# Patient Record
Sex: FEMALE | Race: WHITE | Employment: OTHER | URBAN - METROPOLITAN AREA
[De-identification: names, ages, dates, MRNs, and addresses within clinical notes are randomized per-mention and may not be internally consistent; named-entity substitution may affect disease eponyms.]

---

## 2019-06-25 ENCOUNTER — TRANSFERRED RECORDS (OUTPATIENT)
Dept: HEALTH INFORMATION MANAGEMENT | Facility: CLINIC | Age: 49
End: 2019-06-25

## 2019-07-01 ENCOUNTER — TRANSFERRED RECORDS (OUTPATIENT)
Dept: HEALTH INFORMATION MANAGEMENT | Facility: CLINIC | Age: 49
End: 2019-07-01

## 2019-07-02 ENCOUNTER — TRANSFERRED RECORDS (OUTPATIENT)
Dept: HEALTH INFORMATION MANAGEMENT | Facility: CLINIC | Age: 49
End: 2019-07-02

## 2019-07-05 ENCOUNTER — TELEPHONE (OUTPATIENT)
Dept: ONCOLOGY | Facility: CLINIC | Age: 49
End: 2019-07-05

## 2019-07-08 ENCOUNTER — PRE VISIT (OUTPATIENT)
Dept: ONCOLOGY | Facility: CLINIC | Age: 49
End: 2019-07-08

## 2019-07-08 ENCOUNTER — ONCOLOGY VISIT (OUTPATIENT)
Dept: ONCOLOGY | Facility: CLINIC | Age: 49
End: 2019-07-08
Attending: SURGERY
Payer: COMMERCIAL

## 2019-07-08 ENCOUNTER — TELEPHONE (OUTPATIENT)
Dept: ONCOLOGY | Facility: CLINIC | Age: 49
End: 2019-07-08

## 2019-07-08 ENCOUNTER — ANCILLARY PROCEDURE (OUTPATIENT)
Dept: MAMMOGRAPHY | Facility: CLINIC | Age: 49
End: 2019-07-08
Attending: SURGERY
Payer: COMMERCIAL

## 2019-07-08 VITALS
TEMPERATURE: 98.3 F | HEIGHT: 69 IN | WEIGHT: 143.2 LBS | HEART RATE: 85 BPM | BODY MASS INDEX: 21.21 KG/M2 | DIASTOLIC BLOOD PRESSURE: 78 MMHG | OXYGEN SATURATION: 97 % | RESPIRATION RATE: 16 BRPM | SYSTOLIC BLOOD PRESSURE: 119 MMHG

## 2019-07-08 DIAGNOSIS — Z17.0 MALIGNANT NEOPLASM OF UPPER-OUTER QUADRANT OF LEFT BREAST IN FEMALE, ESTROGEN RECEPTOR POSITIVE (H): ICD-10-CM

## 2019-07-08 DIAGNOSIS — C50.412 MALIGNANT NEOPLASM OF UPPER-OUTER QUADRANT OF LEFT BREAST IN FEMALE, ESTROGEN RECEPTOR POSITIVE (H): ICD-10-CM

## 2019-07-08 DIAGNOSIS — C50.912 MALIGNANT NEOPLASM OF LEFT BREAST (H): Primary | ICD-10-CM

## 2019-07-08 DIAGNOSIS — C50.912 MALIGNANT NEOPLASM OF LEFT BREAST (H): ICD-10-CM

## 2019-07-08 PROCEDURE — 40000803 ZZHCL STATISTIC DNA ISOL HIGH PURITY: Performed by: STUDENT IN AN ORGANIZED HEALTH CARE EDUCATION/TRAINING PROGRAM

## 2019-07-08 PROCEDURE — G0463 HOSPITAL OUTPT CLINIC VISIT: HCPCS | Mod: ZF

## 2019-07-08 PROCEDURE — 36415 COLL VENOUS BLD VENIPUNCTURE: CPT | Performed by: STUDENT IN AN ORGANIZED HEALTH CARE EDUCATION/TRAINING PROGRAM

## 2019-07-08 RX ORDER — ASPIRIN 325 MG
325 TABLET ORAL DAILY
COMMUNITY

## 2019-07-08 RX ORDER — MIRTAZAPINE 30 MG/1
30 TABLET, FILM COATED ORAL AT BEDTIME
COMMUNITY
Start: 2019-06-07

## 2019-07-08 ASSESSMENT — PAIN SCALES - GENERAL: PAINLEVEL: NO PAIN (0)

## 2019-07-08 ASSESSMENT — MIFFLIN-ST. JEOR: SCORE: 1328.93

## 2019-07-08 NOTE — TELEPHONE ENCOUNTER
ONCOLOGY INTAKE: Records Information         new pt for Mon 7/8 -Dr Jiménez   Received: 7/5/19 3:35pm   Message Contents   APPT INFORMATION:   Referring provider:  self referred to Dr Jiménez   Referring provider s clinic: [facility]   Reason for visit/diagnosis:  new left breast cancer     Has the patient been given a timeframe or date/time of appt?: Yes     Is this appointment held on the schedule- Yes Mon 7/8  @4 pm     Is there any additional testing/work up needed prior to visit? Need ER/W[place] results from 7/2     Has the patient been notified of diagnosis and appointment referral? Yes, her sister is a nurse here and is Dr Jiménez's pt     Were the records sent directly to clinic? I think they are pushing images (Med Records [id number])     Has patient been seen for any external appt for this diagnosis (enter clinic/location) that records should be gathered from? Work-up per above

## 2019-07-08 NOTE — TELEPHONE ENCOUNTER
I called Tiffanie & confirmed her appt with Dr Jiménez for today at 4pm. I updated her demographics & insurance information also. [employer]her[employer] sent to records to records team also.
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
## 2019-07-08 NOTE — TELEPHONE ENCOUNTER
RECORDS STATUS - BREAST    RECORDS REQUESTED FROM: [facility]    DATE REQUESTED: 7/8/19   NOTES DETAILS STATUS   OFFICE NOTE from referring provider N/A Self-referred    OFFICE NOTE from medical oncologist N/A    OFFICE NOTE from surgeon N/A    OFFICE NOTE from radiation oncologist N/A    DISCHARGE SUMMARY from hospital N/A    DISCHARGE REPORT from the ER N/A    OPERATIVE REPORT N/A    MEDICATION LIST Complete  Epic/CE   CLINICAL TRIAL TREATMENTS TO DATE N/A    LABS     PATHOLOGY REPORTS  (Tissue diagnosis, Stage, ER/ID percentage positive and intensity of staining, HER2 IHC, FISH, and all biopsies from breast and any distant metastasis)                 Requested  Fax request sent to [phone]  [facility]    GENONOMIC TESTING     TYPE:   (Next Generation Sequencing, including Foundation One testing, and Oncotype score) N/A    IMAGING (NEED IMAGES & REPORT)     CT SCANS     MRI     MAMMO Complete  PACS   ULTRASOUND Complete  PACS   PET     BONE SCAN     BRAIN MRI       Action    Action Taken 7/8/19   12:51pm     IMGs ([place]) being resolved in PACS. [place] will refax MR to Oncology fax number. MR were sent to the breast fax on Friday. IMG reports will be included in fax.   [place]'s ph: [phone]    [facility]: Ph: [phone] Attn: Celsa Steen will fax Bx report   Will send a fax to [phone]    [place]- doesn't have any MR or IMG on file     1:28pm   Urgently faxed MR ([facility]) to HIM.   Sent Bx fax to [facility].     3:41pm Called [facility] back and spoke to Celsa. I mentioned that there might be some additional IMG from 7/2/19. She's going to look into it and call me back soon. It's possible the US biopsy didn't include IMG. Reports are in CE.     Received a call back and resolved two additional IMGs in PACS. Urgently faxed Bx Report to HIM.      Action    Action Taken 7/9/19 10am     [facility] called saying pathology should arrive  tomorrow.

## 2019-07-08 NOTE — NURSING NOTE
"Oncology Rooming Note    July 8, 2019 3:39 PM   Tiffanie Quesada is a 49 year old female who presents for:    Chief Complaint   Patient presents with     New Patient     NEW PT; NEW BREAST CA; VITALS COMPLETED BY CMA      Initial Vitals: /78   Pulse 85   Temp 98.3  F (36.8  C) (Oral)   Resp 16   Ht 1.753 m (5' 9\")   Wt 65 kg (143 lb 3.2 oz)   SpO2 97%   Breastfeeding? No   BMI 21.15 kg/m   Estimated body mass index is 21.15 kg/m  as calculated from the following:    Height as of this encounter: 1.753 m (5' 9\").    Weight as of this encounter: 65 kg (143 lb 3.2 oz). Body surface area is 1.78 meters squared.  No Pain (0) Comment: Data Unavailable   No LMP recorded. Patient has had a hysterectomy.  Allergies reviewed: Yes  Medications reviewed: Yes    Medications: Medication refills not needed today.  Pharmacy name entered into [employer]: [id number] - [place], [place] - [address] [place] [address]    Clinical concerns: No new concerns today  Dr. Jiménez  was notified.      Jackelin Osman              "

## 2019-07-08 NOTE — TELEPHONE ENCOUNTER
7/5/19 4:36pm  I called & left a message for pt to call to confirm appt on 7/8/19 at 4pm with Dr Jessy gottlieb received from Dannielle HANNAH on 7/5/19 at 3:35pm

## 2019-07-08 NOTE — TELEPHONE ENCOUNTER
7/8/19 9:08am  2nd voicemail left for pt to call to confirm appt with Dr Jiménez for today, 7/8/19 at 4pm.

## 2019-07-09 LAB — COPATH REPORT: NORMAL

## 2019-07-12 ENCOUNTER — APPOINTMENT (OUTPATIENT)
Dept: LAB | Facility: CLINIC | Age: 49
End: 2019-07-12
Attending: SURGERY
Payer: COMMERCIAL

## 2019-07-12 PROCEDURE — 00000346 ZZHCL STATISTIC REVIEW OUTSIDE SLIDES TC 88321: Performed by: SURGERY

## 2019-07-15 ENCOUNTER — TELEPHONE (OUTPATIENT)
Dept: ONCOLOGY | Facility: CLINIC | Age: 49
End: 2019-07-15

## 2019-07-15 NOTE — TELEPHONE ENCOUNTER
----- Message from Vanessa Lyon sent at 7/15/2019  8:05 AM CDT -----  Regarding: Pt called back   Hello,  Patient called back regarding decisions about her procedure with Dr. Jiménez. Please contact her when you are able.    Thank you,  Vanessa COPPOLA, Clinic Coordinator  [facility]  [phone]

## 2019-07-15 NOTE — TELEPHONE ENCOUNTER
----- Message -----  From: Velma Dubose PA-C  Sent: 7/15/2019  12:42 PM  To: Pee Jiménez MD, Dannielle Rubio RN  Subject: FW: Pt called back                               I spoke with Tiffanie. She would like to have a bilateral mastectomy without reconstruction. She would like to discuss this over the phone with Dr. Jiménez. She lives over 90 miles away.     Velma

## 2019-07-15 NOTE — TELEPHONE ENCOUNTER
"I spoke to patient per note below. She states that she saw Dr Jiménez last Mon (7/8) for breast cancer surgical consultation & \"he said it was my choice to do lumpectomy or remove both breasts.\" (7/8 MD note currently pending).     Pt would like to know more about next steps if she chooses to remove both breasts. I advised pt that I would send a msg to Dr Jiménez's team but he may not get back to us until tomorrow or Wed as he may be in the OR. Pt agrees & verbalizes understanding of this plan.      "

## 2019-07-16 LAB — COPATH REPORT: NORMAL

## 2019-07-19 DIAGNOSIS — C50.912 MALIGNANT NEOPLASM OF LEFT BREAST (H): Primary | ICD-10-CM

## 2019-07-19 RX ORDER — CEFAZOLIN SODIUM 1 G/50ML
1 INJECTION, SOLUTION INTRAVENOUS SEE ADMIN INSTRUCTIONS
Status: CANCELLED | OUTPATIENT
Start: 2019-07-19

## 2019-07-19 RX ORDER — CEFAZOLIN SODIUM 2 G/50ML
2 SOLUTION INTRAVENOUS
Status: CANCELLED | OUTPATIENT
Start: 2019-07-19

## 2019-07-22 ENCOUNTER — TELEPHONE (OUTPATIENT)
Dept: ONCOLOGY | Facility: CLINIC | Age: 49
End: 2019-07-22

## 2019-07-22 NOTE — TELEPHONE ENCOUNTER
Left message to schedule procedure with Dr Pee Jiménez    Details left with my direct contact number for scheduling.     Edna Arriaga  Elise-Op Surgical Coordinator  [phone]

## 2019-07-23 NOTE — TELEPHONE ENCOUNTER
Spoke with patient to schedule surgery with Dr Pee Jiménez    Surgery date has been requested by OR for:  8/7 at [facility]     Pre/Post or additional appointments will be scheduled once surgery date is confirmed.    Edna Chatterjee  Surgical Elise-Op Coordinator  [phone]

## 2019-07-24 NOTE — TELEPHONE ENCOUNTER
Spoke/left message with: Tiffanie to schedule surgery with Dr Pee Jiménez     Surgery was scheduled on 8/14 at [place] OR    Patient will have pre-surgery visit with PCP -      Nuclear Medicine: TO BE DELIVERED TO THE [facility] [address]    Implants/Special Equipment: na    Wire Loc: na    -Patient advised H&P is needed or surgery cancellation may occur    Post-Op care appointment scheduled?  YES on      Patient is aware a / is needed day of surgery.     Surgery packet was delivered to patient via mail, patient has my direct contact information for any further questions.        Additional comments:       Edna Arriaga  Surgical Elise-Op Coordinator  [phone]     \

## 2019-07-30 DIAGNOSIS — C50.412 MALIGNANT NEOPLASM OF UPPER-OUTER QUADRANT OF LEFT BREAST IN FEMALE, ESTROGEN RECEPTOR POSITIVE (H): Primary | ICD-10-CM

## 2019-07-30 DIAGNOSIS — Z17.0 MALIGNANT NEOPLASM OF UPPER-OUTER QUADRANT OF LEFT BREAST IN FEMALE, ESTROGEN RECEPTOR POSITIVE (H): Primary | ICD-10-CM

## 2019-07-30 PROCEDURE — 81323 PTEN GENE DUP/DELET VARIANT: CPT | Performed by: SURGERY

## 2019-07-30 PROCEDURE — 81479 UNLISTED MOLECULAR PATHOLOGY: CPT | Performed by: SURGERY

## 2019-07-30 PROCEDURE — 81408 MOPATH PROCEDURE LEVEL 9: CPT | Performed by: SURGERY

## 2019-07-30 PROCEDURE — 81405 MOPATH PROCEDURE LEVEL 6: CPT | Performed by: SURGERY

## 2019-07-30 PROCEDURE — 81408 MOPATH PROCEDURE LEVEL 9: CPT | Mod: 91 | Performed by: SURGERY

## 2019-07-30 PROCEDURE — 81162 BRCA1&2 GEN FULL SEQ DUP/DEL: CPT | Performed by: SURGERY

## 2019-07-30 PROCEDURE — 81406 MOPATH PROCEDURE LEVEL 7: CPT | Mod: 91 | Performed by: SURGERY

## 2019-07-30 PROCEDURE — 81321 PTEN GENE FULL SEQUENCE: CPT | Performed by: SURGERY

## 2019-07-30 PROCEDURE — 81406 MOPATH PROCEDURE LEVEL 7: CPT | Performed by: SURGERY

## 2019-07-31 LAB — COPATH REPORT: NORMAL

## 2019-08-07 ENCOUNTER — ANESTHESIA EVENT (OUTPATIENT)
Dept: SURGERY | Facility: CLINIC | Age: 49
End: 2019-08-07
Payer: COMMERCIAL

## 2019-08-13 ENCOUNTER — TELEPHONE (OUTPATIENT)
Dept: ONCOLOGY | Facility: CLINIC | Age: 49
End: 2019-08-13

## 2019-08-13 NOTE — ANESTHESIA PREPROCEDURE EVALUATION
Anesthesia Pre-Procedure Evaluation    Patient: Tiffanie Quesada   MRN:     [id number] Gender:   female   Age:    49 year old :      1970        Preoperative Diagnosis: Malignant Neoplasm Of Left Breast   Procedure(s):  Bilateral Mastectomy, Left [place] Node Biopsy     No past medical history on file.   No past surgical history on file.       Anesthesia Evaluation     . Pt has had prior anesthetic. Type: General           ROS/MED HX    ENT/Pulmonary:     (+)tobacco use (1 cigaretter per day), Current use , . .   Asthma: 1 cig/day.   Neurologic: Comment: Left radial nerve palsy in the past, now resolved.       Cardiovascular:  - neg cardiovascular ROS   (+) ----. : . . . :. . 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         Other:                         PHYSICAL EXAM:   Mental Status/Neuro: A/A/O   Airway: Facies: Feasible  Mallampati: I  Mouth/Opening: Full  TM distance: > 6 cm  Neck ROM: Full   Respiratory: Auscultation: CTAB     Resp. Rate: Normal     Resp. Effort: Normal      CV: Rhythm: Regular  Heart: Normal Sounds  Edema: None   Comments:      Dental: Normal Dentition                LABS:  CBC: No results found for: WBC, HGB, HCT, PLT  BMP: No results found for: NA, POTASSIUM, CHLORIDE, CO2, BUN, CR, GLC  COAGS: No results found for: PTT, INR, FIBR  POC: No results found for: BGM, HCG, HCGS  OTHER: No results found for: PH, LACT, A1C, LAKHWINDER, PHOS, MAG, ALBUMIN, PROTTOTAL, ALT, AST, GGT, ALKPHOS, BILITOTAL, BILIDIRECT, LIPASE, AMYLASE, SADE, TSH, T4, T3, CRP, SED     Preop Vitals    BP Readings from  "Last 3 Encounters:   07/08/19 119/78    Pulse Readings from Last 3 Encounters:   07/08/19 85      Resp Readings from Last 3 Encounters:   07/08/19 16    SpO2 Readings from Last 3 Encounters:   07/08/19 97%      Temp Readings from Last 1 Encounters:   07/08/19 36.8  C (98.3  F) (Oral)    Ht Readings from Last 1 Encounters:   07/08/19 1.753 m (5' 9\")      Wt Readings from Last 1 Encounters:   07/08/19 65 kg (143 lb 3.2 oz)    Estimated body mass index is 21.15 kg/m  as calculated from the following:    Height as of 7/8/19: 1.753 m (5' 9\").    Weight as of 7/8/19: 65 kg (143 lb 3.2 oz).     LDA:        Assessment:   ASA SCORE: 2    H&P: History and physical reviewed and following examination; no interval change.     Smoking Status:  Active Smoker       - patient did not smoke on day of surgery       - instructed to abstain from smoking on day of procedure   NPO Status: NPO Appropriate     Plan:   Anes. Type:  General   Pre-Medication: Acetaminophen; Gabapentin   Induction:  IV (Standard)   Airway: ETT; Oral   Access/Monitoring: PIV; 2nd PIV   Maintenance: Balanced     Postop Plan:   Postop Pain: Opioids  Postop Sedation/Airway: Not planned  Disposition: Inpatient/Admit     PONV Management:   Adult Risk Factors: Female, Postop Opioids   Prevention: Ondansetron, Dexamethasone     CONSENT: Direct conversation   Plan and risks discussed with: Patient   Blood Products: Consented (ALL Blood Products)       Comments for Plan/Consent:  49F smoker with breast cancer here for mastectomy.  ASA 2.  Plan: GETA, PIVx2                   Sunshine Nunez MD  "

## 2019-08-14 ENCOUNTER — HOSPITAL ENCOUNTER (OUTPATIENT)
Dept: NUCLEAR MEDICINE | Facility: CLINIC | Age: 49
Setting detail: NUCLEAR MEDICINE
End: 2019-08-14
Attending: SURGERY
Payer: COMMERCIAL

## 2019-08-14 ENCOUNTER — TRANSFERRED RECORDS (OUTPATIENT)
Dept: HEALTH INFORMATION MANAGEMENT | Facility: CLINIC | Age: 49
End: 2019-08-14

## 2019-08-14 ENCOUNTER — HOSPITAL ENCOUNTER (OUTPATIENT)
Facility: CLINIC | Age: 49
Discharge: HOME | End: 2019-08-15
Attending: SURGERY | Admitting: SURGERY
Payer: COMMERCIAL

## 2019-08-14 ENCOUNTER — ANESTHESIA (OUTPATIENT)
Dept: SURGERY | Facility: CLINIC | Age: 49
End: 2019-08-14
Payer: COMMERCIAL

## 2019-08-14 DIAGNOSIS — C50.912 MALIGNANT NEOPLASM OF LEFT BREAST (H): ICD-10-CM

## 2019-08-14 DIAGNOSIS — G89.18 POST-OPERATIVE PAIN: Primary | ICD-10-CM

## 2019-08-14 PROBLEM — Z90.13 S/P MASTECTOMY, BILATERAL: Status: ACTIVE | Noted: 2019-08-14

## 2019-08-14 LAB — GLUCOSE BLDC GLUCOMTR-MCNC: 88 MG/DL (ref 70–99)

## 2019-08-14 PROCEDURE — 25000132 ZZH RX MED GY IP 250 OP 250 PS 637: Performed by: STUDENT IN AN ORGANIZED HEALTH CARE EDUCATION/TRAINING PROGRAM

## 2019-08-14 PROCEDURE — 25000125 ZZHC RX 250: Performed by: STUDENT IN AN ORGANIZED HEALTH CARE EDUCATION/TRAINING PROGRAM

## 2019-08-14 PROCEDURE — 25000132 ZZH RX MED GY IP 250 OP 250 PS 637: Performed by: PHYSICIAN ASSISTANT

## 2019-08-14 PROCEDURE — 25000566 ZZH SEVOFLURANE, EA 15 MIN: Performed by: SURGERY

## 2019-08-14 PROCEDURE — 25000128 H RX IP 250 OP 636: Performed by: SURGERY

## 2019-08-14 PROCEDURE — 71000015 ZZH RECOVERY PHASE 1 LEVEL 2 EA ADDTL HR: Performed by: SURGERY

## 2019-08-14 PROCEDURE — 25000128 H RX IP 250 OP 636: Performed by: STUDENT IN AN ORGANIZED HEALTH CARE EDUCATION/TRAINING PROGRAM

## 2019-08-14 PROCEDURE — 38792 RA TRACER ID OF SENTINL NODE: CPT

## 2019-08-14 PROCEDURE — 27210794 ZZH OR GENERAL SUPPLY STERILE: Performed by: SURGERY

## 2019-08-14 PROCEDURE — 37000008 ZZH ANESTHESIA TECHNICAL FEE, 1ST 30 MIN: Performed by: SURGERY

## 2019-08-14 PROCEDURE — 36000057 ZZH SURGERY LEVEL 3 1ST 30 MIN - UMMC: Performed by: SURGERY

## 2019-08-14 PROCEDURE — 40000170 ZZH STATISTIC PRE-PROCEDURE ASSESSMENT II: Performed by: SURGERY

## 2019-08-14 PROCEDURE — 37000009 ZZH ANESTHESIA TECHNICAL FEE, EACH ADDTL 15 MIN: Performed by: SURGERY

## 2019-08-14 PROCEDURE — 36000059 ZZH SURGERY LEVEL 3 EA 15 ADDTL MIN UMMC: Performed by: SURGERY

## 2019-08-14 PROCEDURE — 88307 TISSUE EXAM BY PATHOLOGIST: CPT | Performed by: SURGERY

## 2019-08-14 PROCEDURE — 25800030 ZZH RX IP 258 OP 636: Performed by: STUDENT IN AN ORGANIZED HEALTH CARE EDUCATION/TRAINING PROGRAM

## 2019-08-14 PROCEDURE — 25000125 ZZHC RX 250: Performed by: SURGERY

## 2019-08-14 PROCEDURE — 82962 GLUCOSE BLOOD TEST: CPT

## 2019-08-14 PROCEDURE — 25000128 H RX IP 250 OP 636: Performed by: PHYSICIAN ASSISTANT

## 2019-08-14 PROCEDURE — 71000014 ZZH RECOVERY PHASE 1 LEVEL 2 FIRST HR: Performed by: SURGERY

## 2019-08-14 RX ORDER — SODIUM CHLORIDE, SODIUM LACTATE, POTASSIUM CHLORIDE, CALCIUM CHLORIDE 600; 310; 30; 20 MG/100ML; MG/100ML; MG/100ML; MG/100ML
INJECTION, SOLUTION INTRAVENOUS CONTINUOUS
Status: DISCONTINUED | OUTPATIENT
Start: 2019-08-14 | End: 2019-08-14 | Stop reason: HOSPADM

## 2019-08-14 RX ORDER — OXYCODONE HYDROCHLORIDE 5 MG/1
5-10 TABLET ORAL EVERY 4 HOURS PRN
Status: DISCONTINUED | OUTPATIENT
Start: 2019-08-14 | End: 2019-08-15

## 2019-08-14 RX ORDER — GLYCOPYRROLATE 0.2 MG/ML
INJECTION, SOLUTION INTRAMUSCULAR; INTRAVENOUS PRN
Status: DISCONTINUED | OUTPATIENT
Start: 2019-08-14 | End: 2019-08-14

## 2019-08-14 RX ORDER — NALOXONE HYDROCHLORIDE 0.4 MG/ML
.1-.4 INJECTION, SOLUTION INTRAMUSCULAR; INTRAVENOUS; SUBCUTANEOUS
Status: DISCONTINUED | OUTPATIENT
Start: 2019-08-15 | End: 2019-08-15 | Stop reason: HOSPADM

## 2019-08-14 RX ORDER — SODIUM CHLORIDE, SODIUM LACTATE, POTASSIUM CHLORIDE, CALCIUM CHLORIDE 600; 310; 30; 20 MG/100ML; MG/100ML; MG/100ML; MG/100ML
INJECTION, SOLUTION INTRAVENOUS CONTINUOUS PRN
Status: DISCONTINUED | OUTPATIENT
Start: 2019-08-14 | End: 2019-08-14

## 2019-08-14 RX ORDER — LIDOCAINE 40 MG/G
CREAM TOPICAL
Status: DISCONTINUED | OUTPATIENT
Start: 2019-08-14 | End: 2019-08-15 | Stop reason: HOSPADM

## 2019-08-14 RX ORDER — ACETAMINOPHEN 325 MG/1
975 TABLET ORAL ONCE
Status: COMPLETED | OUTPATIENT
Start: 2019-08-14 | End: 2019-08-14

## 2019-08-14 RX ORDER — PROPOFOL 10 MG/ML
INJECTION, EMULSION INTRAVENOUS PRN
Status: DISCONTINUED | OUTPATIENT
Start: 2019-08-14 | End: 2019-08-14

## 2019-08-14 RX ORDER — HYDROMORPHONE HYDROCHLORIDE 1 MG/ML
.3-.5 INJECTION, SOLUTION INTRAMUSCULAR; INTRAVENOUS; SUBCUTANEOUS EVERY 5 MIN PRN
Status: DISCONTINUED | OUTPATIENT
Start: 2019-08-14 | End: 2019-08-14 | Stop reason: HOSPADM

## 2019-08-14 RX ORDER — ACETAMINOPHEN 325 MG/1
325-650 TABLET ORAL EVERY 4 HOURS PRN
Qty: 50 TABLET | Refills: 0 | Status: SHIPPED | OUTPATIENT
Start: 2019-08-14

## 2019-08-14 RX ORDER — CEFAZOLIN SODIUM 1 G/3ML
1 INJECTION, POWDER, FOR SOLUTION INTRAMUSCULAR; INTRAVENOUS SEE ADMIN INSTRUCTIONS
Status: DISCONTINUED | OUTPATIENT
Start: 2019-08-14 | End: 2019-08-14 | Stop reason: HOSPADM

## 2019-08-14 RX ORDER — NALOXONE HYDROCHLORIDE 0.4 MG/ML
.1-.4 INJECTION, SOLUTION INTRAMUSCULAR; INTRAVENOUS; SUBCUTANEOUS
Status: ACTIVE | OUTPATIENT
Start: 2019-08-14 | End: 2019-08-15

## 2019-08-14 RX ORDER — ACETAMINOPHEN 325 MG/1
650 TABLET ORAL EVERY 6 HOURS PRN
Status: DISCONTINUED | OUTPATIENT
Start: 2019-08-14 | End: 2019-08-15

## 2019-08-14 RX ORDER — ONDANSETRON 2 MG/ML
INJECTION INTRAMUSCULAR; INTRAVENOUS PRN
Status: DISCONTINUED | OUTPATIENT
Start: 2019-08-14 | End: 2019-08-14

## 2019-08-14 RX ORDER — LABETALOL 20 MG/4 ML (5 MG/ML) INTRAVENOUS SYRINGE
10
Status: DISCONTINUED | OUTPATIENT
Start: 2019-08-14 | End: 2019-08-14 | Stop reason: HOSPADM

## 2019-08-14 RX ORDER — FENTANYL CITRATE 50 UG/ML
INJECTION, SOLUTION INTRAMUSCULAR; INTRAVENOUS PRN
Status: DISCONTINUED | OUTPATIENT
Start: 2019-08-14 | End: 2019-08-14

## 2019-08-14 RX ORDER — MIRTAZAPINE 15 MG/1
30 TABLET, FILM COATED ORAL AT BEDTIME
Status: DISCONTINUED | OUTPATIENT
Start: 2019-08-14 | End: 2019-08-15 | Stop reason: HOSPADM

## 2019-08-14 RX ORDER — OXYCODONE HYDROCHLORIDE 5 MG/1
5-10 TABLET ORAL EVERY 6 HOURS PRN
Qty: 20 TABLET | Refills: 0 | Status: SHIPPED | OUTPATIENT
Start: 2019-08-14

## 2019-08-14 RX ORDER — ONDANSETRON 4 MG/1
4 TABLET, ORALLY DISINTEGRATING ORAL EVERY 30 MIN PRN
Status: DISCONTINUED | OUTPATIENT
Start: 2019-08-14 | End: 2019-08-14 | Stop reason: HOSPADM

## 2019-08-14 RX ORDER — ISOSULFAN BLUE 50 MG/5ML
INJECTION, SOLUTION SUBCUTANEOUS PRN
Status: DISCONTINUED | OUTPATIENT
Start: 2019-08-14 | End: 2019-08-14 | Stop reason: HOSPADM

## 2019-08-14 RX ORDER — LIDOCAINE HYDROCHLORIDE 20 MG/ML
INJECTION, SOLUTION INFILTRATION; PERINEURAL PRN
Status: DISCONTINUED | OUTPATIENT
Start: 2019-08-14 | End: 2019-08-14

## 2019-08-14 RX ORDER — HYDROMORPHONE HCL/0.9% NACL/PF 0.2MG/0.2
0.2 SYRINGE (ML) INTRAVENOUS
Status: DISCONTINUED | OUTPATIENT
Start: 2019-08-14 | End: 2019-08-15 | Stop reason: HOSPADM

## 2019-08-14 RX ORDER — DEXAMETHASONE SODIUM PHOSPHATE 4 MG/ML
INJECTION, SOLUTION INTRA-ARTICULAR; INTRALESIONAL; INTRAMUSCULAR; INTRAVENOUS; SOFT TISSUE PRN
Status: DISCONTINUED | OUTPATIENT
Start: 2019-08-14 | End: 2019-08-14

## 2019-08-14 RX ORDER — ONDANSETRON 2 MG/ML
4 INJECTION INTRAMUSCULAR; INTRAVENOUS EVERY 30 MIN PRN
Status: DISCONTINUED | OUTPATIENT
Start: 2019-08-14 | End: 2019-08-14 | Stop reason: HOSPADM

## 2019-08-14 RX ORDER — FENTANYL CITRATE 50 UG/ML
25-50 INJECTION, SOLUTION INTRAMUSCULAR; INTRAVENOUS
Status: DISCONTINUED | OUTPATIENT
Start: 2019-08-14 | End: 2019-08-14 | Stop reason: HOSPADM

## 2019-08-14 RX ORDER — GABAPENTIN 300 MG/1
300 CAPSULE ORAL ONCE
Status: COMPLETED | OUTPATIENT
Start: 2019-08-14 | End: 2019-08-14

## 2019-08-14 RX ORDER — CEFAZOLIN SODIUM 2 G/100ML
2 INJECTION, SOLUTION INTRAVENOUS
Status: COMPLETED | OUTPATIENT
Start: 2019-08-14 | End: 2019-08-14

## 2019-08-14 RX ADMIN — CEFAZOLIN SODIUM 2 G: 2 INJECTION, SOLUTION INTRAVENOUS at 07:55

## 2019-08-14 RX ADMIN — LIDOCAINE HYDROCHLORIDE 60 MG: 20 INJECTION, SOLUTION INFILTRATION; PERINEURAL at 07:35

## 2019-08-14 RX ADMIN — OXYCODONE HYDROCHLORIDE 5 MG: 5 TABLET ORAL at 14:50

## 2019-08-14 RX ADMIN — HYDROMORPHONE HYDROCHLORIDE 0.2 MG: 1 INJECTION, SOLUTION INTRAMUSCULAR; INTRAVENOUS; SUBCUTANEOUS at 13:39

## 2019-08-14 RX ADMIN — MIRTAZAPINE 30 MG: 15 TABLET, FILM COATED ORAL at 21:22

## 2019-08-14 RX ADMIN — PHENYLEPHRINE HYDROCHLORIDE 50 MCG: 10 INJECTION INTRAVENOUS at 09:00

## 2019-08-14 RX ADMIN — ROCURONIUM BROMIDE 10 MG: 10 INJECTION INTRAVENOUS at 09:31

## 2019-08-14 RX ADMIN — HYDROMORPHONE HYDROCHLORIDE 0.25 MG: 1 INJECTION, SOLUTION INTRAMUSCULAR; INTRAVENOUS; SUBCUTANEOUS at 11:00

## 2019-08-14 RX ADMIN — PROPOFOL 120 MG: 10 INJECTION, EMULSION INTRAVENOUS at 07:33

## 2019-08-14 RX ADMIN — GLYCOPYRROLATE 0.2 MG: 0.2 INJECTION, SOLUTION INTRAMUSCULAR; INTRAVENOUS at 07:37

## 2019-08-14 RX ADMIN — DEXAMETHASONE SODIUM PHOSPHATE 4 MG: 4 INJECTION, SOLUTION INTRA-ARTICULAR; INTRALESIONAL; INTRAMUSCULAR; INTRAVENOUS; SOFT TISSUE at 07:43

## 2019-08-14 RX ADMIN — Medication 0.2 MG: at 19:45

## 2019-08-14 RX ADMIN — OXYCODONE HYDROCHLORIDE 5 MG: 5 TABLET ORAL at 13:30

## 2019-08-14 RX ADMIN — FENTANYL CITRATE 75 MCG: 50 INJECTION, SOLUTION INTRAMUSCULAR; INTRAVENOUS at 07:33

## 2019-08-14 RX ADMIN — HYDROMORPHONE HYDROCHLORIDE 0.3 MG: 1 INJECTION, SOLUTION INTRAMUSCULAR; INTRAVENOUS; SUBCUTANEOUS at 12:48

## 2019-08-14 RX ADMIN — PHENYLEPHRINE HYDROCHLORIDE 100 MCG: 10 INJECTION INTRAVENOUS at 10:33

## 2019-08-14 RX ADMIN — PHENYLEPHRINE HYDROCHLORIDE 100 MCG: 10 INJECTION INTRAVENOUS at 09:39

## 2019-08-14 RX ADMIN — SODIUM CHLORIDE, POTASSIUM CHLORIDE, SODIUM LACTATE AND CALCIUM CHLORIDE: 600; 310; 30; 20 INJECTION, SOLUTION INTRAVENOUS at 07:48

## 2019-08-14 RX ADMIN — SUGAMMADEX 120 MG: 100 INJECTION, SOLUTION INTRAVENOUS at 10:48

## 2019-08-14 RX ADMIN — SODIUM CHLORIDE, POTASSIUM CHLORIDE, SODIUM LACTATE AND CALCIUM CHLORIDE: 600; 310; 30; 20 INJECTION, SOLUTION INTRAVENOUS at 08:00

## 2019-08-14 RX ADMIN — ACETAMINOPHEN 650 MG: 325 TABLET, FILM COATED ORAL at 18:43

## 2019-08-14 RX ADMIN — ROCURONIUM BROMIDE 5 MG: 10 INJECTION INTRAVENOUS at 09:47

## 2019-08-14 RX ADMIN — FENTANYL CITRATE 25 MCG: 50 INJECTION INTRAMUSCULAR; INTRAVENOUS at 11:54

## 2019-08-14 RX ADMIN — ROCURONIUM BROMIDE 10 MG: 10 INJECTION INTRAVENOUS at 09:04

## 2019-08-14 RX ADMIN — ONDANSETRON 4 MG: 2 INJECTION INTRAMUSCULAR; INTRAVENOUS at 10:25

## 2019-08-14 RX ADMIN — CEFAZOLIN SODIUM 1 G: 2 INJECTION, SOLUTION INTRAVENOUS at 10:18

## 2019-08-14 RX ADMIN — PHENYLEPHRINE HYDROCHLORIDE 100 MCG: 10 INJECTION INTRAVENOUS at 10:29

## 2019-08-14 RX ADMIN — Medication 0.2 MG: at 21:54

## 2019-08-14 RX ADMIN — PHENYLEPHRINE HYDROCHLORIDE 100 MCG: 10 INJECTION INTRAVENOUS at 07:36

## 2019-08-14 RX ADMIN — ONDANSETRON 4 MG: 2 INJECTION INTRAMUSCULAR; INTRAVENOUS at 11:40

## 2019-08-14 RX ADMIN — ROCURONIUM BROMIDE 10 MG: 10 INJECTION INTRAVENOUS at 08:41

## 2019-08-14 RX ADMIN — PHENYLEPHRINE HYDROCHLORIDE 50 MCG: 10 INJECTION INTRAVENOUS at 10:12

## 2019-08-14 RX ADMIN — HYDROMORPHONE HYDROCHLORIDE 0.25 MG: 1 INJECTION, SOLUTION INTRAMUSCULAR; INTRAVENOUS; SUBCUTANEOUS at 09:55

## 2019-08-14 RX ADMIN — SODIUM CHLORIDE, POTASSIUM CHLORIDE, SODIUM LACTATE AND CALCIUM CHLORIDE: 600; 310; 30; 20 INJECTION, SOLUTION INTRAVENOUS at 07:27

## 2019-08-14 RX ADMIN — FENTANYL CITRATE 25 MCG: 50 INJECTION INTRAMUSCULAR; INTRAVENOUS at 11:45

## 2019-08-14 RX ADMIN — HYDROMORPHONE HYDROCHLORIDE 0.3 MG: 1 INJECTION, SOLUTION INTRAMUSCULAR; INTRAVENOUS; SUBCUTANEOUS at 12:06

## 2019-08-14 RX ADMIN — FENTANYL CITRATE 25 MCG: 50 INJECTION INTRAMUSCULAR; INTRAVENOUS at 11:13

## 2019-08-14 RX ADMIN — OXYCODONE HYDROCHLORIDE 10 MG: 5 TABLET ORAL at 18:43

## 2019-08-14 RX ADMIN — ACETAMINOPHEN 975 MG: 325 TABLET, FILM COATED ORAL at 06:41

## 2019-08-14 RX ADMIN — GABAPENTIN 300 MG: 300 CAPSULE ORAL at 06:41

## 2019-08-14 RX ADMIN — MIDAZOLAM 2 MG: 1 INJECTION INTRAMUSCULAR; INTRAVENOUS at 07:24

## 2019-08-14 RX ADMIN — ROCURONIUM BROMIDE 50 MG: 10 INJECTION INTRAVENOUS at 07:37

## 2019-08-14 RX ADMIN — FENTANYL CITRATE 25 MCG: 50 INJECTION INTRAMUSCULAR; INTRAVENOUS at 11:17

## 2019-08-14 RX ADMIN — PHENYLEPHRINE HYDROCHLORIDE 50 MCG: 10 INJECTION INTRAVENOUS at 08:01

## 2019-08-14 RX ADMIN — PHENYLEPHRINE HYDROCHLORIDE 100 MCG: 10 INJECTION INTRAVENOUS at 09:03

## 2019-08-14 RX ADMIN — ACETAMINOPHEN 650 MG: 325 TABLET, FILM COATED ORAL at 13:30

## 2019-08-14 RX ADMIN — FENTANYL CITRATE 50 MCG: 50 INJECTION, SOLUTION INTRAMUSCULAR; INTRAVENOUS at 08:06

## 2019-08-14 ASSESSMENT — PAIN DESCRIPTION - DESCRIPTORS
DESCRIPTORS: ACHING;CONSTANT
DESCRIPTORS: ACHING;CONSTANT;DISCOMFORT

## 2019-08-14 ASSESSMENT — MIFFLIN-ST. JEOR: SCORE: 1298.38

## 2019-08-14 ASSESSMENT — LIFESTYLE VARIABLES: TOBACCO_USE: 1

## 2019-08-14 NOTE — OR NURSING
Bilateral Mastectomy on 8/14/2019    Weight of right mastectomy: 370g  Weight of left mastectomy: 444g

## 2019-08-14 NOTE — OR NURSING
Text page sent to Dr. Jiménez requesting post op orders and discharge patient.    Dr. Carmichael at bedside in PACU.  Aware of bradycardia.  Notify Dr. Carmichael if patient's heart rate <40.

## 2019-08-14 NOTE — ANESTHESIA CARE TRANSFER NOTE
Patient: Tiffanie Quesada    Procedure(s):  Bilateral Mastectomy, Left [place] Node Biopsy    Diagnosis: Malignant Neoplasm Of Left Breast  Diagnosis Additional Information: No value filed.    Anesthesia Type:   General     Note:  Anesthesia Care Transfer Notewriter    Vitals: (Last set prior to Anesthesia Care Transfer)    CRNA VITALS  8/14/2019 1018 - 8/14/2019 1104      8/14/2019             NIBP Mean:  70    Ht Rate:  43  (Abnormal)                 Electronically Signed By: Sunshine Nunez MD  August 14, 2019  11:04 AM

## 2019-08-14 NOTE — OR NURSING
Patient aware we are awaiting orders as to whether she will be discharged versus admitted. Patient tearful and upset with the thought of being discharged, she states that she is not going to be able to manager her pain and care for the drains while at home. Redirected patient with informing her that the hospital is not always the best place to stay. Patient agreed by stating that since she doesn't have a spleen it would be hard for her to fight an infection. Encouraged patient that we would work towards getting her pain taking care of with oral medications prior to her discharge. And began to educate patient on how to take care of drain with stripping them. Will continue to encourage and reinforce discharge goals with patient. Patient provided Apple Juice and cookies, tolerated without any nausea.

## 2019-08-14 NOTE — ANESTHESIA POSTPROCEDURE EVALUATION
Anesthesia POST Procedure Evaluation    Patient: Tiffanie Quesada   MRN:     [id number] Gender:   female   Age:    49 year old :      1970        Preoperative Diagnosis: Malignant Neoplasm Of Left Breast   Procedure(s):  Bilateral Mastectomy, Left [place] Node Biopsy   Postop Comments: No value filed.       Anesthesia Type:  Not documented  General    Reportable Event: YES     PAIN: Uncomplicated   Sign Out status: Comfortable, Well controlled pain     PONV: PONV Occurence     Symptoms:  Nausea only (mild)     Interventions: 5-HT3 antagonist   Sign Out status:  No Nausea or Vomiting     Neuro/Psych: Uneventful perioperative course   Sign Out Status: Preoperative baseline; Age appropriate mentation     Airway/Resp.: Uneventful perioperative course   Sign Out Status: Non labored breathing, age appropriate RR; Resp. Status within EXPECTED Parameters     CV: Uneventful perioperative course   Sign Out status: Appropriate BP and perfusion indices; Appropriate HR/Rhythm     Disposition:   Sign Out in:  PACU  Disposition:  Floor  Recovery Course: Uneventful  Follow-Up: Not required           Last Anesthesia Record Vitals:  CRNA VITALS  2019 1018 - 2019 1118      2019             NIBP Mean:  70    Ht Rate:  43  (Abnormal)           Last PACU Vitals:  Vitals Value Taken Time   /75 2019  1:00 PM   Temp 36.4  C (97.5  F) 2019 11:30 AM   Pulse 73 2019  1:00 PM   Resp 16 2019 12:45 PM   SpO2 98 % 2019  1:01 PM   Temp src     NIBP     Pulse     SpO2     Resp     Temp     Ht Rate 43 2019 10:56 AM   Temp 2     Vitals shown include unvalidated device data.      Electronically Signed By: Kailee Carmichael MD, 2019, 1:02 PM

## 2019-08-14 NOTE — ANESTHESIA CARE TRANSFER NOTE
Patient: Tiffanie Quesada    Procedure(s):  Bilateral Mastectomy, Left [place] Node Biopsy    Diagnosis: Malignant Neoplasm Of Left Breast  Diagnosis Additional Information: No value filed.    Anesthesia Type:   General     Note:  Airway :Room Air  Patient transferred to:PACU  Handoff Report: Identifed the Patient, Identified the Reponsible Provider, Reviewed the pertinent medical history, Discussed the surgical course, Reviewed Intra-OP anesthesia mangement and issues during anesthesia, Set expectations for post-procedure period and Allowed opportunity for questions and acknowledgement of understanding      Vitals: (Last set prior to Anesthesia Care Transfer)    CRNA VITALS  8/14/2019 1018 - 8/14/2019 1104      8/14/2019             NIBP Mean:  70    Ht Rate:  43  (Abnormal)                 Electronically Signed By: Sunshine Nunez MD  August 14, 2019  11:04 AM

## 2019-08-14 NOTE — OP NOTE
Procedure Date: 08/14/2019      PREOPERATIVE DIAGNOSIS:  Left breast cancer.      POSTOPERATIVE DIAGNOSIS:  Left breast cancer.      PROCEDURES PERFORMED:  Lymphatic mapping, bilateral simple mastectomies, and left axillary sentinel lymph node biopsy x 2.      ATTENDING SURGEON:  Pee Jiménez MD.      ANESTHESIA:  General with endotracheal tube intubation.      INDICATIONS FOR SURGERY:  The patient is a 49-year-old woman who was diagnosed with a left breast cancer.  She did have extension of about 4.5 cm towards the chest wall.  She has elected to have a bilateral mastectomy without immediate reconstruction.      PROCEDURE IN DETAIL:  After informed consent, the patient was brought to the operating room, given a general anesthetic and oral endotracheally intubated.  I injected technetium sulfur colloid and isosulfan blue beneath her left areola.  She was prepped and draped in the usual fashion.  We started on the left side.  I did a wide elliptical incision to include the nipple areolar complex.  The Bovie cautery was used to incise the subcutaneous tissues.  Flaps were raised in all directions with the Bovie cautery.  A superior skin flap was raised to the clavicle, a medial skin flap was raised to lateral border of the sternum, an inferior skin flap was raised to inframammary fold.  Next, the breast and pectoralis fascia were removed from the pectoralis major muscle from superior to inferior and from medial to lateral.  After the specimen was dissected off the lateral border of the pectoralis major muscle, it was divided, oriented and sent to Pathology.  Strict hemostasis was obtained with the Bovie cautery.  We identified a hot spot.  We identified 2 lymph nodes.  [place] lymph nodes were marked.  It was not blue and not radioactive.  This was a little firm and this was sent as sentinel lymph node #1.  [place] lymph node #2 was blue and radioactive.  This was excised and had ex vivo counts of 100 counts per  second.  After removal of this lymph node, there were no additional blue radioactive or palpable lymph nodes.  We performed a mirror image incision on the patient's right side.  Again, flaps were raised sharply with the Bovie cautery.  A superior skin flap was raised to the clavicle, medial skin flap was raised to the lateral border of the sternum, and inferior skin flap was raised to inframammary fold.  The breast and pectoralis fascia were removed from the pectoralis major muscle from superior to inferior and from medial to lateral.  After the specimen was dissected off the lateral border of the pectoralis major muscle, it was divided and sent to pathology.  Strict hemostasis was obtained with the Bovie cautery.  We placed two 15 round Mak-Berger drains in the anterior axillary line and secured them to the skin with a 3-0 nylon suture.        The dermis was closed with a running 2-0 Vicryl suture and the skin was closed with a running 4-0 PDS subcuticular stitch.  Dermabond was placed on both incisions and the patient was taken to the recovery room in stable condition.         TYRON PATTERSON MD             D: 2019   T: 2019   MT:       Name:     GWEN STARKS   MRN:      [id number]        Account:        [id number]   :      1970           Procedure Date: 2019      Document: [id number]

## 2019-08-15 VITALS
RESPIRATION RATE: 20 BRPM | SYSTOLIC BLOOD PRESSURE: 103 MMHG | OXYGEN SATURATION: 95 % | TEMPERATURE: 98.8 F | WEIGHT: 136.47 LBS | DIASTOLIC BLOOD PRESSURE: 77 MMHG | HEIGHT: 69 IN | HEART RATE: 60 BPM | BODY MASS INDEX: 20.21 KG/M2

## 2019-08-15 PROCEDURE — 25000128 H RX IP 250 OP 636: Performed by: PHYSICIAN ASSISTANT

## 2019-08-15 PROCEDURE — 99024 POSTOP FOLLOW-UP VISIT: CPT | Performed by: PHYSICIAN ASSISTANT

## 2019-08-15 PROCEDURE — 25000132 ZZH RX MED GY IP 250 OP 250 PS 637: Performed by: STUDENT IN AN ORGANIZED HEALTH CARE EDUCATION/TRAINING PROGRAM

## 2019-08-15 PROCEDURE — 25000132 ZZH RX MED GY IP 250 OP 250 PS 637: Performed by: PHYSICIAN ASSISTANT

## 2019-08-15 RX ORDER — ACETAMINOPHEN 325 MG/1
975 TABLET ORAL EVERY 8 HOURS
Status: DISCONTINUED | OUTPATIENT
Start: 2019-08-15 | End: 2019-08-15 | Stop reason: HOSPADM

## 2019-08-15 RX ORDER — ASPIRIN 325 MG
325 TABLET ORAL DAILY
Status: DISCONTINUED | OUTPATIENT
Start: 2019-08-15 | End: 2019-08-15 | Stop reason: HOSPADM

## 2019-08-15 RX ORDER — OXYCODONE HYDROCHLORIDE 5 MG/1
5-10 TABLET ORAL
Status: DISCONTINUED | OUTPATIENT
Start: 2019-08-15 | End: 2019-08-15 | Stop reason: HOSPADM

## 2019-08-15 RX ADMIN — Medication 0.2 MG: at 05:59

## 2019-08-15 RX ADMIN — OXYCODONE HYDROCHLORIDE 10 MG: 5 TABLET ORAL at 08:36

## 2019-08-15 RX ADMIN — ACETAMINOPHEN 650 MG: 325 TABLET, FILM COATED ORAL at 04:46

## 2019-08-15 RX ADMIN — ASPIRIN 325 MG ORAL TABLET 325 MG: 325 PILL ORAL at 08:36

## 2019-08-15 RX ADMIN — OXYCODONE HYDROCHLORIDE 10 MG: 5 TABLET ORAL at 04:46

## 2019-08-15 ASSESSMENT — PAIN DESCRIPTION - DESCRIPTORS: DESCRIPTORS: ACHING;DISCOMFORT;SORE

## 2019-08-15 NOTE — DISCHARGE SUMMARY
"[facility]  Discharge Summary  Oncology Surgery     Tiffanie Quesada MRN# [id number]   YOB: 1970 Age: 49 year old     Date of Admission:  8/14/2019  Date of Discharge::  8/15/2019 12:26 PM  Admitting Physician:  Pee Jiménez MD  Discharge Physician:    Primary Care Physician:        Omid Beckett          Admission Diagnoses:   Malignant Neoplasm Of Left Breast  S/P mastectomy, bilateral          Discharge Diagnosis:   Same         Procedures:   Procedure(s):  Bilateral Mastectomy, Left [place] Node Biopsy  Surgeon: Dr. Jiménez  Date: 8/14          Brief History of Illness:   Taken from Admission H&P:  \"The patient is a 49-year-old woman who was diagnosed with a left breast cancer.  She did have extension of about 4.5 cm towards the chest wall.  She has elected to have a bilateral mastectomy without immediate reconstruction. \"           Hospital Course:   Pt tolerated the procedure well and was transferred to the observation floor after PACU for pain control and closer monitoring overnight.    On day of discharge to home, patient was tolerating a regular diet, voiding independently, having regular bowel movements, tolerating PO pain medications, and was instructed on follow-up plan and concerning signs or questions.        Day of Discharge Physical Exam:   Blood pressure 103/77, pulse 60, temperature 98.8  F (37.1  C), temperature source Oral, resp. rate 20, height 1.753 m (5' 9\"), weight 61.9 kg (136 lb 7.4 oz), SpO2 95 %, not currently breastfeeding.    Gen: AAOx3, NAD  Pulm: Non-labored breathing  Chest: ACE wrap in place; PREETI drains both with serosanguinous output.   Ext:  Warm and well-perfused         Final Pathology Result:   Pending at time of discharge           Discharge Instructions and Follow-Up:     Discharge Procedure Orders   Discharge Instructions   Order Comments: From Dr Jiménez:    1. Patient to follow up with appointment in 2 weeks with Dr. Jiménez.   2. Do not drive " "while taking narcotic pain medication.   3. May take plain Tylenol as needed for pain.   4. Avoid non-steroidal anti-inflammatory medications (Advil, Ibuprofen, Naproxen, aspirin, etc) for 5-7 days  5. Caution with putting ice on the incision as it will be numb you will not realize it if you leave the ice for too long and can damage your skin.  6. If you develop any fever/chills, worsening pain, redness, swelling, or drainage from your wound please call the clinic (Monday through Friday 8:00am-5:00pm [phone] Dannielle NYE) or on-call surgical oncology resident (nights and weekends [phone] and ask \"I would like to page the Surgical Oncology Resident on call.\")   7. No lifting over 5-10 lbs and no strenuous physical activity for 6 weeks.   8. Keep dressing clean and dry.   9. Monitor the drain output. Record the drain output per 24 hours.   Drain care:  Please keep drain site clean and dry.   Okay to shower in 24 hours.   Please call the clinic (see phone numbers above) if:  Your drain falls out.  The stitch that is holding the drain in place at the skin is coming loose or missing.  The drainage fluid is very thick and/or has a bad odor.  The squeeze bulb does not stay collapsed.  The drainage suddenly stops or dramatically decreases when the drain has been having steady amounts of drainage.  Please keep a log of the drainage amounts every time you empty the drain.    Please \"strip\" the drain 3 times per day:  Wash your hands with soap and water and dry.  Gently squeeze the tubing if you see a clot to loosen it up.   and pinch the drain where it comes out of the skin with one hand, to steady it.  With the other hand,  and pinch the drain and slide your fingers down the tubing, towards the drainage bulb.  Then release your  on the end where the tube comes out of your body, followed by releasing your  at the end of the drainage bulb.    Repeat several times.  It may be easier to 'strip' the drain " with an alcohol swab or with hand cleanser on the hand that is moving along the tube.  Wash your hands again.            Home Health Care:     Not needed           Discharge Disposition:     Discharged to home      Condition at discharge: Stable         Consultations:   None         Imaging Studies:     Results for orders placed or performed during the hospital encounter of 08/14/19   NM Lymphoscintigraphy Injection only    Narrative    Examination:  NM LYMPHOSCINTIGRAPHY INJECTION ONLY ,    Date: 8/14/2019 7:42 AM     Indication:  Dr. Jiménez to inject isotope in hospital OR; Malignant  neoplasm of left breast (H) .    Additional Information: none    Technique:    500  uci of Tc-99m Lymphoseek (r)  was delivered to the [facility] for  operative injection.     Images were not obtained as part of the localization procedure.    I have personally reviewed the examination and initial interpretation  and I agree with the findings.    DRE CHARLES MD              Medications Prior to Admission:     No medications prior to admission.              Discharge Medications:     Discharge Medication List as of 8/15/2019 10:30 AM      START taking these medications    Details   acetaminophen (TYLENOL) 325 MG tablet Take 1-2 tablets (325-650 mg) by mouth every 4 hours as needed for other (mild pain), Disp-50 tablet, R-0, E-Prescribe      oxyCODONE (ROXICODONE) 5 MG tablet Take 1-2 tablets (5-10 mg) by mouth every 6 hours as needed for pain (Moderate to Severe), Disp-20 tablet, R-0, E-Prescribe         CONTINUE these medications which have NOT CHANGED    Details   aspirin (ASA) 325 MG tablet Take 325 mg by mouth daily, Historical      mirtazapine (REMERON) 30 MG tablet Take 30 mg by mouth At Bedtime, Historical             Follow-up with Dr Jiménez in 2 weeks.    Jaiden Brice MD  [facility] Surgery Resident

## 2019-08-15 NOTE — PLAN OF CARE
Status: POD #1 bilateral mastectomy.   VS: VSS on RA. BPs soft, MD aware. Capnography in place.  Neuros: A&Ox4.  GI/: Tolerating regular diet. LBM 8/13. Voiding spontaneously.   IV: 2 L PIVs SL.  Activity: Up independently.  Pain: Incisional pain controlled w/ PRN Oxycodone, Tylenol, and IV Dilaudid.   Respiratory/Trach: No issues.  Skin: L & R PREETI drains. ACE bandage around breasts, MICHEAL incisions.  Plan of Care: Continue to monitor and follow POC.

## 2019-08-15 NOTE — PROGRESS NOTES
Surgical Oncology Progress Note    Interval History:  No acute events overnight. Pain controlled but requiring IV diluadid. Tolerating regular diet. No nausea.     Physical Exam:   Temp:  [97.5  F (36.4  C)-98.8  F (37.1  C)] 98.8  F (37.1  C)  Pulse:  [51-91] 60  Heart Rate:  [44-87] 65  Resp:  [14-20] 20  BP: ()/(56-86) 84/57  SpO2:  [90 %-100 %] 95 %  General: Alert, oriented, appears comfortable, NAD.  Respiratory: breathing non labored  Biler    Data:   All laboratory and imaging data in the past 24 hours reviewed  I/O last 3 completed shifts:  In: 1230 [P.O.:730; I.V.:500]  Out: 220 [Urine:130; Drains:90]    Assessment and Plan:     Tiffanie Quesada is a 49 year old female POD 1 s/p bilateral mastectomy and left sentinel lymph node biopsy for left breast cancer    - regular diet  - oxycodone prn for pain, acetaminophen  - PREETI drain teaching  - discharge to home today if pain controlled.     Seen with Chief resident who will discuss with Staff.     JADE LangfordC   Surgical Oncology

## 2019-08-15 NOTE — PROVIDER NOTIFICATION
Surgery cross cover paged - BPs 88-90/50s. States dizziness, but no other symptoms.     Spoke w/ Amie - No new orders. Will continue to monitor and follow POC.

## 2019-08-15 NOTE — PLAN OF CARE
"/69   Pulse 60   Temp 98.5  F (36.9  C) (Oral)   Resp 16   Ht 1.753 m (5' 9\")   Wt 61.9 kg (136 lb 7.4 oz)   SpO2 95%   BMI 20.15 kg/m    A/O.  Ace wrap to chest cdi.  Incisions UTV.  Bilat PREETI bulbs patent.  Drainage serosang.  25ml out from each L and R.  Pain control adequate with oxycodone and tylenol.  Cms intact.  Bilat radial pulses 2 +.  Tolerating po.  BS audible.  Denies flatus.  Voiding without difficulty.  Oriented to room and unit routine.  Will continue POC.  "

## 2019-08-15 NOTE — PROGRESS NOTES
Drain teaching done.  Discharge instructions reviewed with pt and pt family.  piv x 2 removed.  All questions answered.  Pt left with all belongings.

## 2019-08-16 ENCOUNTER — TELEPHONE (OUTPATIENT)
Dept: SURGERY | Facility: CLINIC | Age: 49
End: 2019-08-16

## 2019-08-16 NOTE — TELEPHONE ENCOUNTER
POST-OP CALL  Aug 16, 2019    Tiffanie Quesada is a 49 year old female s/p bilateral mastectomy.     She reports doing ok. She is sore. She is sleeping.     Fevers/chills: Patient denies fever/chills.  Eating/drinking: Patient is able to eat and drink without any complaints.   Bowel habits: discussed taking miralax daily   Urine output: Voiding without difficulty.   Drains (PREETI): site covered with gauze, denies drainage from around site. Drainage: thin red, Output: is being recorded.    Incisions: Patient denies any concerns   Pain: Patient reports pain is controlled with oxycodone and tylenol.   Follow up appointment needs to be scheduled. Will message scheduling.   Patient will call with any questions or concerns.    Velma Dubose PA-C

## 2019-08-19 ENCOUNTER — TELEPHONE (OUTPATIENT)
Dept: SURGERY | Facility: CLINIC | Age: 49
End: 2019-08-19

## 2019-08-19 NOTE — TELEPHONE ENCOUNTER
Via IB from Dannielle GONZALEZ, PT needs to be scheduled with Velma Dubose and genetic counseling.  PT is traveling a distance and wants to try to coordinate.  At this time we are able to get the same day but not the same location.    Velma Gracy has openings on 8/22; 9/4; 9/5; 9/11 or 9/12 and currently Susana Roldanlers has return slots on 9/4 and 9/11 in [place] that she is offering as long as they are still available.  Please offer these coordinating days to PT and see if she will go to both locations since they are same day.

## 2019-08-20 DIAGNOSIS — G89.18 POSTOPERATIVE PAIN: Primary | ICD-10-CM

## 2019-08-20 LAB — COPATH REPORT: NORMAL

## 2019-08-20 RX ORDER — OXYCODONE HYDROCHLORIDE 5 MG/1
5 TABLET ORAL EVERY 6 HOURS PRN
Qty: 20 TABLET | Refills: 0 | Status: SHIPPED | OUTPATIENT
Start: 2019-08-20

## 2019-08-20 NOTE — TELEPHONE ENCOUNTER
ONCOLOGY INTAKE: Records Information      APPT INFORMATION:  Referring provider:  Enedelia Dubose  Referring provider s clinic:   [facility]  Reason for visit/diagnosis:  Breast cancer  Has patient been notified of appointment date and time?: yes    RECORDS INFORMATION:  Were the records received with the referral (via [employer])? No, internal patient all records are in epic

## 2019-08-20 NOTE — TELEPHONE ENCOUNTER
Patient called in to schedule appointments.  We still need to work on the genetic counseling appt, as the next available is in October.    Patient needs a call back regarding her refill for Oxycodone, [facility] in [facility].      Patient also reports the following:  Bulb 1 on 8/16/19 was at 50ml, and on 8/19/19 was at 20ml  Bulb 2 on 8/16/19 was at 43ml, and on 8/19/19 was at 30ml.    Tube one is leaking a bit at the entrance site.    Patient reports, pinkish fluid, no blood, clots, or smells.

## 2019-08-21 ENCOUNTER — TELEPHONE (OUTPATIENT)
Dept: ONCOLOGY | Facility: CLINIC | Age: 49
End: 2019-08-21

## 2019-08-21 NOTE — TELEPHONE ENCOUNTER
RECORDS STATUS - BREAST    RECORDS REQUESTED FROM: Epic   DATE REQUESTED: 8/21/19   NOTES DETAILS STATUS   OFFICE NOTE from referring provider [facility] Velma Dubose   OFFICE NOTE from medical oncologist Epic    OFFICE NOTE from surgeon     OFFICE NOTE from radiation oncologist     DISCHARGE SUMMARY from hospital [facility] 8/14/19   DISCHARGE REPORT from the  NA    OPERATIVE REPORT [facility] 8/14/19   MEDICATION LIST     CLINICAL TRIAL TREATMENTS TO DATE     LABS     PATHOLOGY REPORTS  (Tissue diagnosis, Stage, ER/MI percentage positive and intensity of staining, HER2 IHC, FISH, and all biopsies from breast and any distant metastasis)                 [facility] Surg Path: 8/14/19  Path Consult: 7/12/19    GENONOMIC TESTING     TYPE:   (Next Generation Sequencing, including Foundation One testing, and Oncotype score) Epic 7/8/19   IMAGING (NEED IMAGES & REPORT)     NM Lympho PACS 8/14/19   CT SCANS     MRI     MAMMO PACS 7/8/19    [facility], Report in CE: 7/2/19, 7/1/19, 6/25/19   ULTRASOUND PACS [facility], Report in CE: 7/2/19, 7/1/19   PET     BONE SCAN     BRAIN MRI

## 2019-08-21 NOTE — TELEPHONE ENCOUNTER
ONCOLOGY INTAKE: Records Information      APPT INFORMATION:  Referring provider:  Dr. Pee Jiménez/Velma Dubose  Referring provider s clinic:   [facility]  Reason for visit/diagnosis:  Breast Cancer  Has patient been notified of appointment date and time?: yes    RECORDS INFORMATION:  Were the records received with the referral (via [employer])? No - internal referral all records are in epic

## 2019-08-23 ENCOUNTER — OFFICE VISIT (OUTPATIENT)
Dept: SURGERY | Facility: CLINIC | Age: 49
End: 2019-08-23
Attending: PHYSICIAN ASSISTANT
Payer: COMMERCIAL

## 2019-08-23 VITALS
TEMPERATURE: 97.7 F | HEIGHT: 69 IN | BODY MASS INDEX: 19.99 KG/M2 | SYSTOLIC BLOOD PRESSURE: 93 MMHG | WEIGHT: 135 LBS | RESPIRATION RATE: 16 BRPM | HEART RATE: 60 BPM | OXYGEN SATURATION: 99 % | DIASTOLIC BLOOD PRESSURE: 71 MMHG

## 2019-08-23 DIAGNOSIS — Z90.13 S/P MASTECTOMY, BILATERAL: Primary | ICD-10-CM

## 2019-08-23 PROCEDURE — 99024 POSTOP FOLLOW-UP VISIT: CPT | Mod: ZP | Performed by: PHYSICIAN ASSISTANT

## 2019-08-23 PROCEDURE — G0463 HOSPITAL OUTPT CLINIC VISIT: HCPCS | Mod: ZF

## 2019-08-23 ASSESSMENT — PAIN SCALES - GENERAL: PAINLEVEL: SEVERE PAIN (6)

## 2019-08-23 ASSESSMENT — MIFFLIN-ST. JEOR: SCORE: 1291.99

## 2019-08-23 NOTE — PROGRESS NOTES
"FOLLOW-UP  Aug 23, 2019    Tiffanie Quesada is a 49 year old female who returns for a post-operative follow-up visit. She is 1.5 weeks post op.     HPI:    She underwent a bilateral mastectomy and left sentinel lymph node biopsy for left breast cancer on 8/14/19 with Dr. Jmiénez.     Surgical pathology demonstrated invasive mammary carcinoma, grade 2, measuring 1.4 cm, ER positive, AL positive, HER2 negative, margins negative, 0/2 negative sentinel lymph nodes. Pathology was discussed with the patient.     She reports doing well since surgery. She is taking oxycodone and tylenol for pain. She denies any fever, swelling, erythema or drainage. Her drain output is 15-10 from each drain for 3 days.    BP 93/71 (BP Location: Right arm, Patient Position: Sitting, Cuff Size: Adult Regular)   Pulse 60   Temp 97.7  F (36.5  C) (Oral)   Resp 16   Ht 1.753 m (5' 9.02\")   Wt 61.2 kg (135 lb)   SpO2 99%   BMI 19.93 kg/m     Physical Exam  Bilateral chest wall incisions are clean, dry and intact. Drains with serous output. Drains were removed.   PREETI drains were removed.    ASSESSMENT:    Tiffanie Quesada is a 49 year old female with T1cN0, stage 1A left breast cancer s/p bilateral mastectomy and left axillary sentinel lymph node biopsy with Dr. Jiménez.     PLAN:  1. PREETI drains were removed.   2. Follow up with Medical Oncology.     Velma Dubose PA-C  "

## 2019-08-23 NOTE — LETTER
"8/23/2019       RE: Tiffanie Quesada  [address] [place] [address] [address]  [facility] [address]     Dear Colleague,    Thank you for referring your patient, Tiffanie Quesada, to the [facility]. Please see a copy of my visit note below.    FOLLOW-UP  Aug 23, 2019    Tiffanie Quesada is a 49 year old female who returns for a post-operative follow-up visit. She is 1.5 weeks post op.     HPI:    She underwent a bilateral mastectomy and left sentinel lymph node biopsy for left breast cancer on 8/14/19 with Dr. Jiménez.     Surgical pathology demonstrated invasive mammary carcinoma, grade 2, measuring 1.4 cm, ER positive, [place] positive, HER2 negative, margins negative, 0/2 negative sentinel lymph nodes. Pathology was discussed with the patient.     She reports doing well since surgery. She is taking oxycodone and tylenol for pain. She denies any fever, swelling, erythema or drainage. Her drain output is 15-10 from each drain for 3 days.    BP 93/71 (BP Location: Right arm, Patient Position: Sitting, Cuff Size: Adult Regular)   Pulse 60   Temp 97.7  F (36.5  C) (Oral)   Resp 16   Ht 1.753 m (5' 9.02\")   Wt 61.2 kg (135 lb)   SpO2 99%   BMI 19.93 kg/m      Physical Exam  Bilateral chest wall incisions are clean, dry and intact. Drains with serous output. Drains were removed.   PREETI drains were removed.    ASSESSMENT:    Tiffanie Quesada is a 49 year old female with T1cN0, stage 1A left breast cancer s/p bilateral mastectomy and left axillary sentinel lymph node biopsy with Dr. Jiménez.     PLAN:  1. PREETI drains were removed.   2. Follow up with Medical Oncology.     Velma Dubose PA-C  "

## 2019-08-23 NOTE — NURSING NOTE
"Oncology Rooming Note    August 23, 2019 10:02 AM   Tiffanie Quesada is a 49 year old female who presents for:    Chief Complaint   Patient presents with     Oncology Clinic Visit     Malignant neoplasm of upper-outer quadrant of left breast in female, estrogen receptor positive      Initial Vitals: There were no vitals taken for this visit. Estimated body mass index is 20.15 kg/m  as calculated from the following:    Height as of 8/14/19: 1.753 m (5' 9\").    Weight as of 8/14/19: 61.9 kg (136 lb 7.4 oz). There is no height or weight on file to calculate BSA.  Data Unavailable Comment: Data Unavailable   No LMP recorded. Patient has had a hysterectomy.  Allergies reviewed: Yes  Medications reviewed: Yes    Medications: Medication refills not needed today.  Pharmacy name entered into [employer]: [id number] - [place], [place] - [address] [place] [address]    Clinical concerns: NONE        Jody Robin, CMA              "

## 2019-08-27 ENCOUNTER — TELEPHONE (OUTPATIENT)
Dept: ONCOLOGY | Facility: CLINIC | Age: 49
End: 2019-08-27

## 2019-08-27 NOTE — TELEPHONE ENCOUNTER
Call to [facility] in [place] for oncology referral; all records faxed to [phone]. They will call pt to arrange appt. Oncotype pending.

## 2019-09-04 ENCOUNTER — ONCOLOGY VISIT (OUTPATIENT)
Dept: ONCOLOGY | Facility: CLINIC | Age: 49
End: 2019-09-04
Attending: GENETIC COUNSELOR, MS
Payer: COMMERCIAL

## 2019-09-04 ENCOUNTER — PRE VISIT (OUTPATIENT)
Dept: SURGERY | Facility: CLINIC | Age: 49
End: 2019-09-04

## 2019-09-04 ENCOUNTER — PRE VISIT (OUTPATIENT)
Dept: ONCOLOGY | Facility: CLINIC | Age: 49
End: 2019-09-04

## 2019-09-04 DIAGNOSIS — Z17.0 MALIGNANT NEOPLASM OF UPPER-OUTER QUADRANT OF LEFT BREAST IN FEMALE, ESTROGEN RECEPTOR POSITIVE (H): Primary | ICD-10-CM

## 2019-09-04 DIAGNOSIS — Z80.3 FAMILY HISTORY OF MALIGNANT NEOPLASM OF BREAST: ICD-10-CM

## 2019-09-04 DIAGNOSIS — C50.412 MALIGNANT NEOPLASM OF UPPER-OUTER QUADRANT OF LEFT BREAST IN FEMALE, ESTROGEN RECEPTOR POSITIVE (H): Primary | ICD-10-CM

## 2019-09-04 PROCEDURE — 96040 ZZH GENETIC COUNSELING, EACH 30 MINUTES: CPT | Performed by: GENETIC COUNSELOR, MS

## 2019-09-04 NOTE — PROGRESS NOTES
Cancer Risk Management Program Genetic Counseling Note    9/4/2019    Referring Provider: Dr. Pee Jiménez    Presenting Information:   I met with Tiffanie Quesada today for genetic counseling at the Cancer Risk Management Program at the [facility] in [place], in order to discuss her personal and family history of breast cancercancer.  She is here today to review this history, cancer screening recommendations, and her previous genetic testing results. She was here today with her significant other.    Personal History:  Tiffanie is a 49 year old female. Tiffanie underwent a screening mammogram this summer, which noted some suspicious areas in her left breast. She underwent biopsies of these areas, one of which showed invasive ductal carcinoma that was ER positive, MT positive, and HER2 negative.   She decided to undergo a bilateral mastectomy with a left sentinel node biopsy.    Pathology studies confirmed invasive mammary cancer with DCIS and atypical ductal hyperplasia in the left breat; two sentinel nodes were negative for malignancy.  The right breast had a fibroadenoma but no other atypical findings.  Tiffanie reports that she plans to have Medical Oncology follow-up at the [facility].       Tiffanie reports that she had her first menstrual period at around age 14; she reports that she thinks she is currently starting to have hormonal menopause symptoms.  She has no biological children.  Tiffanie states that she had her uterus removed about 10 years ago because of issues with painful cramping during her periods.  She states that her ovaries were left intact.  She reports that she has a past history of oral contraceptive use for around 20 years.    Tiffanie reports that she has not yet had a colonoscopy. She states that she does not regularly do any other cancer screening at this time.     Tiffanie reports a past history of smoking during her 20's and beyond, but she reports that she  permanently quit smoking this summer.  She also reports that feels like she has had time periods in her life since her 20s in which she feels like she had chronic, excessive alcohol use; however, she reports that this has not been an issue for her for the last 2 years.   She states that she is not aware of any other personal history of any specific, potentially concerning environmental exposures with respect to cancer risk.    Family History: (Please see scanned pedigree for detailed family history information)    As noted above, Tiffanie was diagnosed with breast cancer at age 49.    Tiffanie reports that her sister was diagnosed with breast cancer at age 50, for which she also underwent a bilateral mastectomy.  Tiffanie reports that this sister currently has some genetic testing pending.    Tiffanie reports that her father was diagnosed with prostate cancer around age 70.      Tiffanie states her father has a sister that was diagnosed with breast cancer in her 60s.    Tiffanie reports that she thinks that there may be a history of breast cancer in some of the cousins on her father's side of the family, but she does not know the details of this.    Tiffanie reports that she thinks that her paternal grandfather may have had some type of cancer, but she did not know the details.    Tiffanie was not aware of any cancers on her mother's side of the family.    Tiffanie reports that her family is of Pashto, Greek, and Armenian ancestry.  She reports no known [employer] ancestry.      Discussion:    We reviewed the features of sporadic, familial, and hereditary cancers. In looking at Tiffanie's family history, it is possible that a cancer susceptibility gene is present due to the multiple breast cancers in her family.  We also talked about that prostate cancer can sometimes be associated with breast cancers in some hereditary cancer syndromes.    We discussed the natural history and genetics of hereditary cancer syndromes associated with breast cancer. A detailed  handout regarding the information we discussed was provided to Tiffanie at the end of our appointment today and can be found in the after visit summary. Topics included: inheritance pattern, cancer risks, cancer screening recommendations, and also risks, benefits and limitations of testing.    Based on her personal and family history, Tiffanie meets current National Comprehensive Cancer Network (NCCN) criteria for genetic testing of BRCA1/BRCA2.  Because of this, she had previous genetic testing ordered from her oncology team.  (The Breast Cancer Actionable genetic testing panel through here at the [facility].)  Tiffanie stated today that she was not aware of her previous genetic testing results, and so we reviewed these results today.  Tiffanie is negative for mutations in the JAH, BRCA1, BRCA2, CDH1, CHEK2, NBN, NF1, PALB2, STK11,  PTEN, and TP53 genes by sequencing and deletion/duplication analysis. No mutations were found in any of the 11 genes analyzed.  Therefore, she does not have an identifiable mutation associated with Hereditary Breast and Ovarian Cancer syndrome (BRCA1, BRCA2), Hereditary Diffuse Gastric Cancer (CDH1), Peutz-Jeghers syndrome (STK11), Neurofibromatosis type 1 (NF1), Cowden syndrome (PTEN), or Li Fraumeni syndrome (TP53).  I provided Tiffanie with a copy of her test results; I also provided her with a handout about her negative test results (see after visit summary).    We discussed that there are additional genes that could cause increased risk for breast cancer. As many of these genes present with overlapping features in a family and accurate cancer risk cannot always be established based upon the pedigree analysis alone, it would be reasonable for Tiffanie to consider panel genetic testing to analyze multiple genes at once.    We reviewed the various panel options for testing of additional genes, as well as their potential benefits and limitations.  After this  conversation, Tiffanie decided that she was not interested in additional genetic testing for genes known to be associated with increased cancer risks at this time.  She states that she might consider additional genetic testing in the future.    Medical Management: For Tiffanie, we reviewed that the information from additional genetic testing may determine:    additional cancer screening for which Tiffanie may qualify (i.e.  more frequent colonoscopies, more frequent dermatologic exams, etc.),    options for risk reducing surgeries Tiffanie could consider (i.e. surgery to remove her ovaries, etc.),      and targeted therapies for Tiffanie's recent cancer, or if she were to develop certain cancers in the future (i.e. immunotherapy for individuals with Zendejas syndrome, PARP inhibitors, etc.).     These recommendations and possible targeted therapies will be discussed in detail once if Tiffanie decides to have more genetic testing in the future.     Plan:  1) Today, Tiffanie decided after our conversation that she wasn't interested in additional genetic testing at this time.  She was going to talk with her sister about if she might be interested in additional genetic testing (assuming her sister's initial genetic testing turns out to be negative).  Depending on those conversations, Tiffanie might consider additional genetic testing at a later time.  2) Tiffanie is encouraged to re-contact me if her sister's genetic testing does identify a particular gene variant associated with increased cancer risk.    Susana Manzano MS, [facility]  Genetic Counselor  Ph: [phone]    Face to face time: 40 minutes

## 2019-09-04 NOTE — LETTER
Cancer Risk Management  Program Locations    [facility]  [facility]  Mailing Address  Cancer Risk Management Program  [address]  [place] [address]    New patient appointments  [phone]  October 3, 2019    Tiffanie K Dipak  [address]  [facility] [address]      Dear Tiffanie,    It was a pleasure meeting with you at [facility] in [place] on 9-4-19; I apologize for the delay in getting this letter to you.  Here is a copy of the progress note from your recent genetic counseling visit to the Cancer Risk Management Program.  If you have any additional questions, please feel free to call.    Cancer Risk Management Program Genetic Counseling Note    9/4/2019    Referring Provider: Dr. Pee Jiménez    Presenting Information:   I met with Tiffanie Dipak today for genetic counseling at the Cancer Risk Management Program at the [facility] in [place], in order to discuss her personal and family history of breast cancercancer.  She is here today to review this history, cancer screening recommendations, and her previous genetic testing results. She was here today with her significant other.    Personal History:  Tiffanie is a 49 year old female. Tiffanie underwent a screening mammogram this summer, which noted some suspicious areas in her left breast. She underwent biopsies of these areas, one of which showed invasive ductal carcinoma that was ER positive, PA positive, and HER2 negative.   She decided to undergo a bilateral mastectomy with a left sentinel node biopsy.    Pathology studies confirmed invasive mammary cancer with DCIS and atypical ductal hyperplasia in the left breat; two sentinel nodes were negative for malignancy.  The right breast had a  fibroadenoma but no other atypical findings.  Tiffanie reports that she plans to have Medical Oncology follow-up at the [facility].       Tiffanie reports that she had her first menstrual period at around age 14; she reports that she thinks she is currently starting to have hormonal menopause symptoms.  She has no biological children.  Tiffanie states that she had her uterus removed about 10 years ago because of issues with painful cramping during her periods.  She states that her ovaries were left intact.  She reports that she has a past history of oral contraceptive use for around 20 years.    Tiffanie reports that she has not yet had a colonoscopy. She states that she does not regularly do any other cancer screening at this time.     Tiffanie reports a past history of smoking during her 20's and beyond, but she reports that she permanently quit smoking this summer.  She also reports that feels like she has had time periods in her life since her 20s in which she feels like she had chronic, excessive alcohol use; however, she reports that this has not been an issue for her for the last 2 years.   She states that she is not aware of any other personal history of any specific, potentially concerning environmental exposures with respect to cancer risk.    Family History: (Please see scanned pedigree for detailed family history information)    As noted above, Tiffanie was diagnosed with breast cancer at age 49.    Tiffanie reports that her sister was diagnosed with breast cancer at age 50, for which she also underwent a bilateral mastectomy.  Tiffanie reports that this sister currently has some genetic testing pending.    Tiffanie reports that her father was diagnosed with prostate cancer around age 70.      Tiffanie states her father has a sister that was diagnosed with breast cancer in her 60s.    Tiffanie reports that she thinks that there may be a history of breast cancer in some of the cousins on her father's side of the  family, but she does not know the details of this.    Tiffanie reports that she thinks that her paternal grandfather may have had some type of cancer, but she did not know the details.    Tiffanie was not aware of any cancers on her mother's side of the family.    Tiffanie reports that her family is of [place], [place]ian, and [place] ancestry.  She reports no known [employer] ancestry.      Discussion:    We reviewed the features of sporadic, familial, and hereditary cancers. In looking at Tiffanie's family history, it is possible that a cancer susceptibility gene is present due to the multiple breast cancers in her family.  We also talked about that prostate cancer can sometimes be associated with breast cancers in some hereditary cancer syndromes.    We discussed the natural history and genetics of hereditary cancer syndromes associated with breast cancer. A detailed handout regarding the information we discussed was provided to Tiffanie at the end of our appointment today and can be found in the after visit summary. Topics included: inheritance pattern, cancer risks, cancer screening recommendations, and also risks, benefits and limitations of testing.    Based on her personal and family history, Tiffanie meets current National Comprehensive Cancer Network (NCCN) criteria for genetic testing of BRCA1/BRCA2.  Because of this, she had previous genetic testing ordered from her oncology team.  (The Breast Cancer Actionable genetic testing panel through here at the [facility].)  Tiffanie stated today that she was not aware of her previous genetic testing results, and so we reviewed these results today.  Tiffanie is negative for mutations in the JAH, BRCA1, BRCA2, CDH1, CHEK2, NBN, NF1, PALB2, STK11,  PTEN, and TP53 genes by sequencing and deletion/duplication analysis. No mutations were found in any of the 11 genes analyzed.  Therefore, she does not have an identifiable mutation associated with Hereditary  Breast and Ovarian Cancer syndrome (BRCA1, BRCA2), Hereditary Diffuse Gastric Cancer (CDH1), Peutz-Jeghers syndrome (STK11), Neurofibromatosis type 1 (NF1), Cowden syndrome (PTEN), or Li Fraumeni syndrome (TP53).  I provided Tiffanie with a copy of her test results; I also provided her with a handout about her negative test results (see after visit summary).    We discussed that there are additional genes that could cause increased risk for breast cancer. As many of these genes present with overlapping features in a family and accurate cancer risk cannot always be established based upon the pedigree analysis alone, it would be reasonable for Tiffanie to consider panel genetic testing to analyze multiple genes at once.    We reviewed the various panel options for testing of additional genes, as well as their potential benefits and limitations.  After this conversation, Tiffanie decided that she was not interested in additional genetic testing for genes known to be associated with increased cancer risks at this time.  She states that she might consider additional genetic testing in the future.    Medical Management: For Tiffanie, we reviewed that the information from additional genetic testing may determine:    additional cancer screening for which Tiffanie may qualify (i.e.  more frequent colonoscopies, more frequent dermatologic exams, etc.),    options for risk reducing surgeries Tiffanie could consider (i.e. surgery to remove her ovaries, etc.),      and targeted therapies for Tiffanie's recent cancer, or if she were to develop certain cancers in the future (i.e. immunotherapy for individuals with Zendejas syndrome, PARP inhibitors, etc.).     These recommendations and possible targeted therapies will be discussed in detail once if Tiffanie decides to have more genetic testing in the future.     Plan:  1) Today, Tiffanie decided after our conversation that she wasn't interested in additional genetic testing at this time.  She was going to talk with her  sister about if she might be interested in additional genetic testing (assuming her sister's initial genetic testing turns out to be negative).  Depending on those conversations, Tiffanie might consider additional genetic testing at a later time.  2) Tiffanie is encouraged to re-contact me if her sister's genetic testing does identify a particular gene variant associated with increased cancer risk.    Susana Manzano MS, [facility]  Genetic Counselor  Ph: [phone]    Face to face time: 40 minutes

## 2019-09-04 NOTE — PATIENT INSTRUCTIONS
Assessing Cancer Risk  Only about 5-10% of cancers are thought to be due to an inherited cancer susceptibility gene.    These families often have:    Several people with the same or related types of cancer    Cancers diagnosed at a young age (before age 50)    Individuals with more than one primary cancer    Multiple generations of the family affected with cancer    Some people may be candidates for genetic testing of more than one gene.  For these families, genetic testing using a multi-gene cancer panel may be offered.  These panels may test genes known to increase the risk for breast (and other) cancers: JAH, BRCA1, BRCA2, CDH1, CHEK2, PALB2, PTEN, and TP53.  The purpose of this handout is to serve as a brief summary of the high/moderate-risk breast cancer genes which have published clinical management guidelines for individuals who are found to carry a mutation.    Hereditary Breast and Ovarian Cancer Syndrome (HBOC)  A single mutation in one of the copies of BRCA1 or BRCA2 increases the risk for breast and ovarian cancer, among others.  The risk for pancreatic cancer and melanoma may also be slightly increased in some families.  The tables below list the chance that someone with a BRCA mutation would develop cancer in his or her lifetime1,2,3,4.          Women   Men     General Population BRCA+    General Population BRCA+   Breast  12% 40-80%  Breast <1% ~7%   Ovarian  1-2% 10-40%  Prostate 16% 20%     A person s ethnic background is also important to consider, as individuals of Ashkenazi Denominational ancestry have a higher chance of having a BRCA gene mutation.  There are three BRCA mutations that occur more frequently in this population.    Li-Fraumeni Syndrome (LFS)  LFS is a cancer predisposition syndrome. Individuals with LFS are at an increased risk for developing cancer at a young age. The general lifetime risk for development of cancer is 50% by age 30 and 90% by age 60.  LFS is caused by a mutation in the  TP53 gene.  A single mutation in one of the copies of TP53 increases the risk for multiple cancers.     Core Cancers: Sarcomas, Breast, Brain, Lung, Leukemias/Lymphomas, Adrenocortical carcinomas  Other Cancers: Gastrointestinal, Thyroid, Skin, Genitourinary    Cowden Syndrome  Cowden syndrome is a hereditary condition that increases the risk for breast, thyroid, endometrial, and kidney cancer.  Cowden syndrome is caused by a mutation in the PTEN gene.  A single mutation in one of the copies of PTEN causes Cowden syndrome and increases cancer risk.  The table below shows the chance that someone with a PTEN mutation would develop cancer in their lifetime5,6.  Other benign features seen in some individuals with Cowden syndrome include benign skin lesions (facial papules, keratoses, lipomas), learning disability, autism, thyroid nodules, colon polyps, and larger head size.      Lifetime Cancer Risk   Cancer Type General Population Cowden Syndrome   Breast  12% 25-50%*   Thyroid  1% 35%   Renal 1-2% 35%   Endometrial  2-3% 28%   Colon 5% 9%   Melanoma 2-3% >5%     *One recent study found breast cancer risk to be increased to 85%    Hereditary Diffuse Gastric Cancer (HDGC)  Currently, one gene is known to cause hereditary diffuse gastric cancer: CDH1.  Individuals with HDGC are at increased risk for diffuse gastric cancer and lobular breast cancer. Of people diagnosed with HDGC, 30-50% have a mutation in the CDH1 gene.  This suggests there are likely other genes that may cause HDGC that have not been identified yet.      Lifetime Cancer Risks    General Pop HDGC    Diffuse Gastric  <1% ~80%   Breast 12% 39-52%     Additional Genes Associated with Increased Breast Cancer Risk  PALB2  Mutations in PALB2 have been shown to increase the risk of breast cancer up to 33-58% in some families; where individuals fall within this risk range is dependent upon family history.9 PALB2 mutations have also been associated with increased  risk for pancreatic cancer, although this risk has not been quantified yet.  Individuals who inherit two PALB2 mutations--one from their mother and one from their father--have a condition called Fanconi Anemia.  This rare autosomal recessive condition is associated with short stature, developmental delay, bone marrow failure, and increased risk for childhood cancers.    JAH  JAH is a moderate-risk breast cancer gene. Women who have a mutation in JAH can have between a 2-4 fold increased risk for breast cancer compared to the general population.10  JAH mutations have also been associated with increased risk for pancreatic cancer, however an estimate of this cancer risk is not well understood.11  Individuals who inherit two JAH mutations have a condition called ataxia-telangiectasia (AT).  This rare autosomal recessive condition affects the nervous system and immune system, and is associated with progressive cerebellar ataxia beginning in childhood.  Individuals with ataxia-telangiectasia often have a weakened immune system and have an increased risk for childhood cancers.           CHEK2   CHEK2 is a moderate-risk breast cancer gene.  Women who have a mutation in CHEK2 have around a 2-fold increased risk for breast cancer compared to the general population, and this risk may be higher depending upon family history.12,13,14  Mutations in CHEK2 have also been shown to increase the risk of a number of other cancers, including colon and prostate, however these cancer risks are currently not well understood.         Inheritance   All of the genes reviewed above are inherited in an autosomal dominant pattern.  This means that if a parent has a mutation, each of his or her children will have a 50% chance of inheriting that same mutation.  Therefore, each child--male or female--would have a 50% chance of being at increased risk for developing cancer.    Image obtained from Genetics Home Reference, 2013     Mutations in some  genes can occur de regan, which means that a person s mutation occurred for the first time in them and was not inherited from a parent.  Now that they have the mutation, however, it can be passed on to future generations.    Genetic Testing  Genetic testing involves a blood test and will look for any harmful mutations within genes that are associated with increased cancer risk.  If possible, it is recommended that the person(s) who has had cancer be tested before other family members.  That person will give us the most useful information about whether or not a specific gene is associated with the cancer in the family.    Results  There are three possible results of genetic testing:    Positive--a harmful mutation was identified in one or more of the genes    Negative--no mutation was identified in any of the genes on this panel    Variant of unknown significance--a variation in one of the genes was identified, but it is unclear how this impacts cancer risk in the family    Advantages and Disadvantages  There are advantages and disadvantages to genetic testing.  Advantages    May clarify your cancer risk    Can help you make medical decisions    May explain the cancers in your family    May give useful information to your family members (if you share your results)    Disadvantages    Possible negative emotional impact of learning about inherited cancer risk    Uncertainty in interpreting a negative test result in some situations    Possible genetic discrimination concerns (see below)    Genetic Information Nondiscrimination Act (SAEED)  SAEED is a federal law that protects individuals from health insurance or employment discrimination based on a genetic test result alone.  Although rare, there are currently no legal discrimination protections in terms of life insurance, long term care, or disability insurances.  Visit the National Human Genome Research [place] genome.gov/28310742 to learn more.    Reducing Cancer  Risk  Each of the genes listed within this handout have nationally recognized cancer screening guidelines that would be recommended for individuals who test positive.  In addition to increased cancer screening, surgeries may be offered or recommended to reduce cancer risk.  Recommendations are based upon an individual s genetic test result as well as their personal and family history of cancer.    Questions to Think About Regarding Genetic Testing    What effect will the test result have on me and my relationship with my family members if I have an inherited gene mutation?  If I don t have a gene mutation?    Should I share my test results, and how will my family react to this news, which may also affect them?    Are my children ready to learn new information that may one day affect their own health?    Resources  FORCE: Facing Our Risk of Cancer Empowered facingourrisk.org   Bright Pink bebrightpink.org   Li-Fraumeni Syndrome Association lfsassociation.org   PTEN World PTENworld.Vigo   No stomach for cancer, Inc. nostomachforcancer.org   Stomach cancer relief network scrnet.org   Collaborative Group of the Americas on Inherited Colorectal Cancer (CGA) cgaicc.com    Cancer Care cancercare.org   American Cancer Society (ACS) cancer.org   National Cancer [place] (NCI) cancer.gov     Please call us if you have any questions or concerns.   Cancer Risk Management Program [phone] ([phone])  ? Donita Triston, MS, [facility]  [phone]  ? All Carpenter, MS  [phone]  ? Susana Manzano, MS, [facility]  [phone]  ? Prerna Fernandez, MS, [facility]  [phone]  ? Charito Calle, MS, [facility]  [phone]  ? aVl Sanders, MS, [facility] [phone]  ? Sowmya Dexter, MS, [facility] [phone]    References  1. Pasquale BostonP, Sirena S, Michelle WEINER, Kaalni JE, Parisa JL, Chuck N, Malena H, Maura O, Magalie A, Prisca B, Angela P, Latia S, Mitesh DM, Nimesh N, Valencia E, Prashant BRONSON, Daren E, Tomas J, Linda MANDUJANO, Christine COPPOLA, Janeth BRONSON,  Denise S, Jerry H, Mireya H, Gela K, Angella OP. Average risks of breast and ovarian cancer associated with BRCA1 or BRCA2 mutations detected in case series unselected for family history: a combined analysis of 222 studies. Am J Hum Sarah. 2003;72:1117-30.  2. Chelsie N, Patricia M, Prudence G.  BRCA1 and BRCA2 Hereditary Breast and Ovarian Cancer. Gene Reviews online. 2013.  3. Hitesh YC, Mason S, Aura G, Hook S. Breast cancer risk among male BRCA1 and BRCA2 mutation carriers. J Natl Cancer Inst. 2007;99:1811-4.  4. Caleb GALLO, Rosey I, Clarke J, Ollie E, Pablo ER, Brandie F. Risk of breast cancer in male BRCA2 carriers. J Med Sarah. 2010;47:710-1.  5. Misha MH, Marylu J, Lee J, Cori LA, Olaf MS, Eng C. Lifetime cancer risks in individuals with germline PTEN mutations. Clin Cancer Res. 2012;18:400-7.  6. Zeferino CARDOZA. Cowden Syndrome: A Critical Review of the Clinical Literature. J Sarah . 2009:18:13-27.  7. National Comprehensive Cancer Network. Clinical practice guidelines in oncology, colorectal cancer screening. Available online (registration required). 2013.  8. National Cancer [place]. SEER Cancer Stat Fact Sheets.  December 2013.  9. Maddy DOWNING, et al. Breast-Cancer Risk in Families with Mutations in PALB2. NEJM. 2014; 371(6):497-506.  10. Kymberly A, Kulwant D, Eulogio S, Nakita P, Jose T, Courtney M, Jose M B, Alli H, Aruna R, Reanna K, Jose Daniel L, Caleb GALLO, Mitesh D, Artie DF, Radha MR, The Breast Cancer Susceptibility Collaboration ([place]) & Mayra N. JAH mutations that cause ataxia-telangiectasia are breast cancer susceptibility alleles. Nature Genetics. 2006;38:873-875  11. Anthony N , Katie Y, Tayler J, Oracio L, Doc GM , Clarissa ML, Gallinger S, Morocho AG, Syngal S, Uma ML, Steven J , Malissa R, Ana SZ, Gala JR, Jaswant VE, Jovanny M, Vojackiestein B, Fabiola N, Andry RH, Kareem KW, and Rohit AP. JAH mutations in patients with  hereditary pancreatic cancer. Cancer Discover. 2012;2:41-46  12. CHEK2 Breast Cancer Case-Control Consortium. CHEK2*1100delC and susceptibility to breast cancer: A collaborative analysis involving 10,860 breast cancer cases and 9,065 controls from 10 studies. Am J Hum Sarah, 74 (2004), pp. 4768-9270  13. Sim T, Leelee S, Dieudonne K, et al. Spectrum of Mutations in BRCA1, BRCA2, CHEK2, and TP53 in Families at High Risk of Breast Cancer. MIKIE. 2006;295(12):6678-2041.   14. Isi C, Karyn D, Fabienne CHRISTIE, et al. Risk of breast cancer in women with a CHEK2 mutation with and without a family history of breast cancer. J Clin Oncol. 2011;29:7352-4307            Negative Genetic Test Result    Genetic Testing  You had a blood test that looked at the genetic information in one or more genes associated with increased cancer risk.  The testing looked for any harmful changes that would stop this particular gene from working like it should. If an individual does not have any harmful changes or variants of unknown significance found from their blood test, their genetic test result is reported as negative.       Results  The genetic test did not identify any pathogenic (harmful) changes in the genes that were tested. There are several possible explanations for a negative test result. Without knowing the gene mutation in your family, the cause of the cancer in you or your relatives is still unknown. Your genetic counselor can help interpret the result for you and your relatives. In this case, there are several reasons that may explain the negative test result:    There may be a gene mutation in the family that you did not inherit.     You may have a gene mutation in a different gene that was not included in the test, or has not yet been discovered.     The cancers in you or your family may be due to a combination of genetic factors and environment (multifactorial/familial).    The cancers in you or your family may be  sporadic/random cancers.    There is very small chance that a mutation was not found by current testing methods.  As testing technology evolves over time, it may still be possible to identify a mutation in a gene that was not found on this test.    It is important to note which genes were included in your test. A list of these genes can be found on your test result.    Screening Recommendations  Due to this negative test result, cancer screening recommendations should be based on your personal and family history. This may include increased cancer screening for you and/or your family members. Your genetic counselor and health care provider can help make appropriate recommendations.      Please call us if you have any questions or concerns.   Cancer Risk Management Program [phone]-CANCER ([phone])  ? Donita Goldstein, MS, [facility]  [phone]  ? All Carpenter, MS  [phone]  ? Susana Manzano, MS, [facility]  [phone]  ? Prerna Fernandez, MS, [facility]  [phone]  ? Charito Calle, MS, [facility]  [phone]  ? Val Sanders, MS, [facility] [phone]  ? Sowmya Dexter, MS, [facility] [phone]

## 2019-09-05 ENCOUNTER — TELEPHONE (OUTPATIENT)
Dept: ONCOLOGY | Facility: CLINIC | Age: 49
End: 2019-09-05

## 2019-09-05 LAB — LAB SCANNED RESULT: NORMAL

## 2019-09-05 NOTE — TELEPHONE ENCOUNTER
Patient confirms quitting smoking in July. She states things are going well.     Tobacco Treatment Team at the [facility] spoke with Ms. Quesada on 9/5/2019 regarding the tobacco cessation program and she declined to participate with the program.No further calls needed.    [facility] Root [facility]  Tobacco Treatment Specialist  PH: [phone]

## 2019-09-12 ENCOUNTER — TRANSFERRED RECORDS (OUTPATIENT)
Dept: HEALTH INFORMATION MANAGEMENT | Facility: CLINIC | Age: 49
End: 2019-09-12

## 2024-07-11 NOTE — TELEPHONE ENCOUNTER
She reports increased pain after stirring frozen corn on the stove and getting her drain caught on the table. The drain is still working. She is taking 5 mg of oxycodone and 325 mg of acetaminophen every 5-6 hours. She was instructed to take 2 tylenol every 6 hours. She was given a refill of oxycodone.    Brother

## (undated) DEVICE — SUCTION MANIFOLD DORNOCH ULTRA CART UL-CL500

## (undated) DEVICE — SU ETHILON 3-0 PS-1 18" 1663H

## (undated) DEVICE — Device

## (undated) DEVICE — GLOVE PROTEXIS POWDER FREE SMT 8.0  2D72PT80X

## (undated) DEVICE — SU VICRYL 2-0 SH 27" UND J417H

## (undated) DEVICE — WIPES FOLEY CARE SURESTEP PROVON DFC100

## (undated) DEVICE — CLIP HORIZON SM RED WIDE SLOT 001201

## (undated) DEVICE — CATH TRAY FOLEY SURESTEP 16FR W/URNE MTR STLK LATEX A303316A

## (undated) DEVICE — DRAPE IOBAN INCISE 23X17" 6650EZ

## (undated) DEVICE — SPONGE LAP 18X18" X8435

## (undated) DEVICE — DRAIN JACKSON PRATT RESERVOIR 100ML SU130-1305

## (undated) DEVICE — SU PDS II 4-0 FS-2 27" Z422H

## (undated) DEVICE — CLIP HORIZON MED BLUE 002200

## (undated) DEVICE — SU VICRYL 3-0 SH 27" J316H

## (undated) DEVICE — LINEN TOWEL PACK X6 WHITE 5487

## (undated) DEVICE — MARKER MARGIN MARKER STD 6 COLOR SGL USE MMS6

## (undated) DEVICE — LINEN TOWEL PACK X5 5464

## (undated) DEVICE — ADH SKIN CLOSURE PREMIERPRO EXOFIN 1.0ML 3470

## (undated) DEVICE — ESU GROUND PAD ADULT W/CORD E7507

## (undated) DEVICE — DRAIN JACKSON PRATT 15FR ROUND SU130-1323

## (undated) DEVICE — PREP CHLORAPREP 26ML TINTED ORANGE  260815

## (undated) DEVICE — SU SILK 3-0 SH 30" K832H

## (undated) DEVICE — ESU ELEC BLADE 2.75" COATED/INSULATED E1455

## (undated) RX ORDER — FENTANYL CITRATE 50 UG/ML
INJECTION, SOLUTION INTRAMUSCULAR; INTRAVENOUS
Status: DISPENSED
Start: 2019-08-14

## (undated) RX ORDER — LIDOCAINE HYDROCHLORIDE 20 MG/ML
INJECTION, SOLUTION EPIDURAL; INFILTRATION; INTRACAUDAL; PERINEURAL
Status: DISPENSED
Start: 2019-08-14

## (undated) RX ORDER — ISOSULFAN BLUE 50 MG/5ML
INJECTION, SOLUTION SUBCUTANEOUS
Status: DISPENSED
Start: 2019-08-14

## (undated) RX ORDER — PHENYLEPHRINE HCL IN 0.9% NACL 1 MG/10 ML
SYRINGE (ML) INTRAVENOUS
Status: DISPENSED
Start: 2019-08-14

## (undated) RX ORDER — ONDANSETRON 2 MG/ML
INJECTION INTRAMUSCULAR; INTRAVENOUS
Status: DISPENSED
Start: 2019-08-14

## (undated) RX ORDER — GABAPENTIN 300 MG/1
CAPSULE ORAL
Status: DISPENSED
Start: 2019-08-14

## (undated) RX ORDER — EPHEDRINE SULFATE 50 MG/ML
INJECTION, SOLUTION INTRAMUSCULAR; INTRAVENOUS; SUBCUTANEOUS
Status: DISPENSED
Start: 2019-08-14

## (undated) RX ORDER — HYDROMORPHONE HYDROCHLORIDE 1 MG/ML
INJECTION, SOLUTION INTRAMUSCULAR; INTRAVENOUS; SUBCUTANEOUS
Status: DISPENSED
Start: 2019-08-14

## (undated) RX ORDER — ACETAMINOPHEN 325 MG/1
TABLET ORAL
Status: DISPENSED
Start: 2019-08-14

## (undated) RX ORDER — CEFAZOLIN SODIUM 2 G/100ML
INJECTION, SOLUTION INTRAVENOUS
Status: DISPENSED
Start: 2019-08-14

## (undated) RX ORDER — OXYCODONE HYDROCHLORIDE 5 MG/1
TABLET ORAL
Status: DISPENSED
Start: 2019-08-14

## (undated) RX ORDER — DEXAMETHASONE SODIUM PHOSPHATE 4 MG/ML
INJECTION, SOLUTION INTRA-ARTICULAR; INTRALESIONAL; INTRAMUSCULAR; INTRAVENOUS; SOFT TISSUE
Status: DISPENSED
Start: 2019-08-14

## (undated) RX ORDER — PROPOFOL 10 MG/ML
INJECTION, EMULSION INTRAVENOUS
Status: DISPENSED
Start: 2019-08-14